# Patient Record
Sex: MALE | Race: OTHER | NOT HISPANIC OR LATINO | URBAN - METROPOLITAN AREA
[De-identification: names, ages, dates, MRNs, and addresses within clinical notes are randomized per-mention and may not be internally consistent; named-entity substitution may affect disease eponyms.]

---

## 2017-10-04 ENCOUNTER — EMERGENCY (EMERGENCY)
Facility: HOSPITAL | Age: 67
LOS: 1 days | Discharge: HOME | End: 2017-10-04
Attending: EMERGENCY MEDICINE | Admitting: EMERGENCY MEDICINE
Payer: COMMERCIAL

## 2017-10-04 VITALS
OXYGEN SATURATION: 96 % | HEART RATE: 81 BPM | RESPIRATION RATE: 20 BRPM | DIASTOLIC BLOOD PRESSURE: 71 MMHG | TEMPERATURE: 100 F | SYSTOLIC BLOOD PRESSURE: 143 MMHG

## 2017-10-04 DIAGNOSIS — Z95.5 PRESENCE OF CORONARY ANGIOPLASTY IMPLANT AND GRAFT: Chronic | ICD-10-CM

## 2017-10-04 PROCEDURE — 99284 EMERGENCY DEPT VISIT MOD MDM: CPT | Mod: 25

## 2017-10-04 RX ORDER — MORPHINE SULFATE 50 MG/1
4 CAPSULE, EXTENDED RELEASE ORAL ONCE
Qty: 0 | Refills: 0 | Status: DISCONTINUED | OUTPATIENT
Start: 2017-10-04 | End: 2017-10-05
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
## 2017-10-04 NOTE — ED PROVIDER NOTE - PROGRESS NOTE DETAILS
X-ray did not show any signs of fracture. Films have not been officially read. Pt was informed that he will be called for any update. Pt is stable and ready to be discharged.
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
## 2017-10-04 NOTE — ED PROVIDER NOTE - PHYSICAL EXAMINATION
LE exam: Large ecchymosis to the medial aspect of the left knee, normal ROM at the Knee/Hip and ankle, significant swelling to the left ankle, no erythema, no warmth to touch, tenderness to the left hip
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
## 2017-10-05 VITALS
SYSTOLIC BLOOD PRESSURE: 153 MMHG | TEMPERATURE: 98 F | OXYGEN SATURATION: 97 % | RESPIRATION RATE: 18 BRPM | HEART RATE: 73 BPM | DIASTOLIC BLOOD PRESSURE: 90 MMHG

## 2017-10-05 PROCEDURE — 73502 X-RAY EXAM HIP UNI 2-3 VIEWS: CPT

## 2017-10-05 PROCEDURE — 73590 X-RAY EXAM OF LOWER LEG: CPT | Mod: 26,LT

## 2017-10-05 PROCEDURE — 73564 X-RAY EXAM KNEE 4 OR MORE: CPT | Mod: 26,LT

## 2017-10-05 PROCEDURE — 73610 X-RAY EXAM OF ANKLE: CPT

## 2017-10-05 PROCEDURE — 73552 X-RAY EXAM OF FEMUR 2/>: CPT

## 2017-10-05 PROCEDURE — 73564 X-RAY EXAM KNEE 4 OR MORE: CPT

## 2017-10-05 PROCEDURE — 99284 EMERGENCY DEPT VISIT MOD MDM: CPT | Mod: 25

## 2017-10-05 PROCEDURE — 73552 X-RAY EXAM OF FEMUR 2/>: CPT | Mod: 26,LT

## 2017-10-05 PROCEDURE — 73610 X-RAY EXAM OF ANKLE: CPT | Mod: 26,LT

## 2017-10-05 PROCEDURE — 73502 X-RAY EXAM HIP UNI 2-3 VIEWS: CPT | Mod: 26,LT

## 2017-10-05 PROCEDURE — 73590 X-RAY EXAM OF LOWER LEG: CPT

## 2017-10-05 RX ORDER — ACETAMINOPHEN 500 MG
975 TABLET ORAL ONCE
Qty: 0 | Refills: 0 | Status: COMPLETED | OUTPATIENT
Start: 2017-10-05 | End: 2017-10-05

## 2017-10-05 RX ADMIN — Medication 975 MILLIGRAM(S): at 00:54

## 2017-10-05 NOTE — ED POST DISCHARGE NOTE - OTHER COMMUNICATION
patient felt the site where he had an abrasion while on the phone with me and was able to remove a curved metallic fragment from the skin. He reports the fragment to be almost 1 cm in length. He has no complaints and plans to cover the site with a bandage. I instructed the patient to return to the ED if he has any wound changes at the site. I informed him that he will require a tetanus shot from his primary care doctor.. - Fernie Mckeon PA-C

## 2018-02-01 ENCOUNTER — OUTPATIENT (OUTPATIENT)
Dept: OUTPATIENT SERVICES | Facility: HOSPITAL | Age: 68
LOS: 1 days | End: 2018-02-01
Payer: MEDICAID

## 2018-02-01 DIAGNOSIS — Z95.5 PRESENCE OF CORONARY ANGIOPLASTY IMPLANT AND GRAFT: Chronic | ICD-10-CM

## 2018-02-24 ENCOUNTER — EMERGENCY (EMERGENCY)
Facility: HOSPITAL | Age: 68
LOS: 1 days | Discharge: HOME | End: 2018-02-24
Attending: EMERGENCY MEDICINE | Admitting: EMERGENCY MEDICINE
Payer: MEDICARE

## 2018-02-24 VITALS
HEIGHT: 74 IN | TEMPERATURE: 98 F | OXYGEN SATURATION: 94 % | RESPIRATION RATE: 18 BRPM | WEIGHT: 192.02 LBS | SYSTOLIC BLOOD PRESSURE: 134 MMHG | DIASTOLIC BLOOD PRESSURE: 92 MMHG | HEART RATE: 88 BPM

## 2018-02-24 VITALS
HEART RATE: 82 BPM | TEMPERATURE: 98 F | OXYGEN SATURATION: 99 % | RESPIRATION RATE: 17 BRPM | SYSTOLIC BLOOD PRESSURE: 115 MMHG | DIASTOLIC BLOOD PRESSURE: 68 MMHG

## 2018-02-24 DIAGNOSIS — Z95.5 PRESENCE OF CORONARY ANGIOPLASTY IMPLANT AND GRAFT: Chronic | ICD-10-CM

## 2018-02-24 LAB
ALBUMIN SERPL ELPH-MCNC: 3.9 G/DL — SIGNIFICANT CHANGE UP (ref 3.3–5)
ALP SERPL-CCNC: 57 U/L — SIGNIFICANT CHANGE UP (ref 40–120)
ALT FLD-CCNC: 19 U/L RC — SIGNIFICANT CHANGE UP (ref 10–45)
ANION GAP SERPL CALC-SCNC: 11 MMOL/L — SIGNIFICANT CHANGE UP (ref 5–17)
AST SERPL-CCNC: 15 U/L — SIGNIFICANT CHANGE UP (ref 10–40)
BASOPHILS # BLD AUTO: 0 K/UL — SIGNIFICANT CHANGE UP (ref 0–0.2)
BASOPHILS NFR BLD AUTO: 0.7 % — SIGNIFICANT CHANGE UP (ref 0–2)
BILIRUB SERPL-MCNC: 0.6 MG/DL — SIGNIFICANT CHANGE UP (ref 0.2–1.2)
BUN SERPL-MCNC: 27 MG/DL — HIGH (ref 7–23)
CALCIUM SERPL-MCNC: 9.3 MG/DL — SIGNIFICANT CHANGE UP (ref 8.4–10.5)
CHLORIDE SERPL-SCNC: 101 MMOL/L — SIGNIFICANT CHANGE UP (ref 96–108)
CO2 SERPL-SCNC: 25 MMOL/L — SIGNIFICANT CHANGE UP (ref 22–31)
CREAT SERPL-MCNC: 1.46 MG/DL — HIGH (ref 0.5–1.3)
EOSINOPHIL # BLD AUTO: 0.1 K/UL — SIGNIFICANT CHANGE UP (ref 0–0.5)
EOSINOPHIL NFR BLD AUTO: 1.3 % — SIGNIFICANT CHANGE UP (ref 0–6)
FLUAV H1 2009 PAND RNA SPEC QL NAA+PROBE: DETECTED
GLUCOSE SERPL-MCNC: 137 MG/DL — HIGH (ref 70–99)
HCT VFR BLD CALC: 42.7 % — SIGNIFICANT CHANGE UP (ref 39–50)
HGB BLD-MCNC: 14.1 G/DL — SIGNIFICANT CHANGE UP (ref 13–17)
LYMPHOCYTES # BLD AUTO: 1 K/UL — SIGNIFICANT CHANGE UP (ref 1–3.3)
LYMPHOCYTES # BLD AUTO: 17.3 % — SIGNIFICANT CHANGE UP (ref 13–44)
MCHC RBC-ENTMCNC: 30.6 PG — SIGNIFICANT CHANGE UP (ref 27–34)
MCHC RBC-ENTMCNC: 33 GM/DL — SIGNIFICANT CHANGE UP (ref 32–36)
MCV RBC AUTO: 92.7 FL — SIGNIFICANT CHANGE UP (ref 80–100)
MONOCYTES # BLD AUTO: 0.6 K/UL — SIGNIFICANT CHANGE UP (ref 0–0.9)
MONOCYTES NFR BLD AUTO: 10.4 % — SIGNIFICANT CHANGE UP (ref 2–14)
NEUTROPHILS # BLD AUTO: 3.9 K/UL — SIGNIFICANT CHANGE UP (ref 1.8–7.4)
NEUTROPHILS NFR BLD AUTO: 70.3 % — SIGNIFICANT CHANGE UP (ref 43–77)
PLATELET # BLD AUTO: 187 K/UL — SIGNIFICANT CHANGE UP (ref 150–400)
POTASSIUM SERPL-MCNC: 4.8 MMOL/L — SIGNIFICANT CHANGE UP (ref 3.5–5.3)
POTASSIUM SERPL-SCNC: 4.8 MMOL/L — SIGNIFICANT CHANGE UP (ref 3.5–5.3)
PROT SERPL-MCNC: 7.1 G/DL — SIGNIFICANT CHANGE UP (ref 6–8.3)
RAPID RVP RESULT: DETECTED
RBC # BLD: 4.61 M/UL — SIGNIFICANT CHANGE UP (ref 4.2–5.8)
RBC # FLD: 11.4 % — SIGNIFICANT CHANGE UP (ref 10.3–14.5)
SODIUM SERPL-SCNC: 137 MMOL/L — SIGNIFICANT CHANGE UP (ref 135–145)
WBC # BLD: 5.5 K/UL — SIGNIFICANT CHANGE UP (ref 3.8–10.5)
WBC # FLD AUTO: 5.5 K/UL — SIGNIFICANT CHANGE UP (ref 3.8–10.5)

## 2018-02-24 PROCEDURE — 85027 COMPLETE CBC AUTOMATED: CPT

## 2018-02-24 PROCEDURE — 87633 RESP VIRUS 12-25 TARGETS: CPT

## 2018-02-24 PROCEDURE — 71046 X-RAY EXAM CHEST 2 VIEWS: CPT

## 2018-02-24 PROCEDURE — 99284 EMERGENCY DEPT VISIT MOD MDM: CPT | Mod: 25

## 2018-02-24 PROCEDURE — 99283 EMERGENCY DEPT VISIT LOW MDM: CPT | Mod: 25

## 2018-02-24 PROCEDURE — 87486 CHLMYD PNEUM DNA AMP PROBE: CPT

## 2018-02-24 PROCEDURE — 80053 COMPREHEN METABOLIC PANEL: CPT

## 2018-02-24 PROCEDURE — 71046 X-RAY EXAM CHEST 2 VIEWS: CPT | Mod: 26

## 2018-02-24 PROCEDURE — 87798 DETECT AGENT NOS DNA AMP: CPT

## 2018-02-24 PROCEDURE — 87581 M.PNEUMON DNA AMP PROBE: CPT

## 2018-02-24 RX ORDER — SODIUM CHLORIDE 9 MG/ML
1000 INJECTION INTRAMUSCULAR; INTRAVENOUS; SUBCUTANEOUS ONCE
Qty: 0 | Refills: 0 | Status: COMPLETED | OUTPATIENT
Start: 2018-02-24 | End: 2018-02-24

## 2018-02-24 RX ADMIN — SODIUM CHLORIDE 1000 MILLILITER(S): 9 INJECTION INTRAMUSCULAR; INTRAVENOUS; SUBCUTANEOUS at 04:04

## 2018-02-24 RX ADMIN — Medication 75 MILLIGRAM(S): at 06:35

## 2018-02-24 NOTE — ED PROVIDER NOTE - PLAN OF CARE
1) You were here for flu.    2) Take your medicine as prescribed.   3) Follow up with your primary doctor for further evaluation and to answer any questions you have.    4) Return to the emergency department if you experience worsening symptoms, pain, fever, chills, nausea, vomiting or other concerning symptoms.

## 2018-02-24 NOTE — ED ADULT NURSE NOTE - OBJECTIVE STATEMENT
66 yo male pt with history of dm, cad, s/p one stent one year ago presents to ed complaining of cough and fevers for three days. pt states he took tylenol at 1am prior to arrival in ed. pt afebrile. pt also endorses left sided intermittent pleuritic nonradiating sharp chest pain that only occurs while coughing. dry cough presents. pt denies acosta/chills/diaphoresis/n/v/decreased po intake/weakness/sob/recent travel/sick contacts. breath sounds clear and equal bilaterally. skin warm dry and intact. abd nontender and nondistended. pt ambulates with steady gait.

## 2018-02-24 NOTE — ED PROVIDER NOTE - MEDICAL DECISION MAKING DETAILS
Cough, myalgias, fever x 2 days.  Decreased PO but not ill appearing.  No signficiant co-morbidities.  Would benefit from fluids, labs/RVP, CXR, reassess.  Likely viral syndrome, likely d/c.  --BMM

## 2018-02-24 NOTE — ED PROVIDER NOTE - CARE PLAN
Principal Discharge DX:	Influenza A  Assessment and plan of treatment:	1) You were here for flu.    2) Take your medicine as prescribed.   3) Follow up with your primary doctor for further evaluation and to answer any questions you have.    4) Return to the emergency department if you experience worsening symptoms, pain, fever, chills, nausea, vomiting or other concerning symptoms.

## 2018-02-24 NOTE — ED PROVIDER NOTE - OBJECTIVE STATEMENT
67M with past medical history Diabetes Mellitus, Coronary Artery Disease status post stent 1 yr ago presents with 3d h/o cough, intermittent pleuritic L sided non radiating, sharp chest pain and suprapubic pain.  Pain occurs with cough, not otherwise.  Denies exertional symptoms.  Denies fever, chills, n/v, diaphoresis, shortness of breath, recent travel.  Got flu vaccine this year.

## 2018-02-26 DIAGNOSIS — R69 ILLNESS, UNSPECIFIED: ICD-10-CM

## 2018-02-27 ENCOUNTER — APPOINTMENT (OUTPATIENT)
Dept: FAMILY MEDICINE | Facility: CLINIC | Age: 68
End: 2018-02-27
Payer: MEDICARE

## 2018-02-27 VITALS
BODY MASS INDEX: 30.51 KG/M2 | HEIGHT: 68.9 IN | OXYGEN SATURATION: 96 % | DIASTOLIC BLOOD PRESSURE: 70 MMHG | HEART RATE: 73 BPM | SYSTOLIC BLOOD PRESSURE: 142 MMHG | RESPIRATION RATE: 15 BRPM | TEMPERATURE: 98.4 F | WEIGHT: 206 LBS

## 2018-02-27 DIAGNOSIS — Z78.9 OTHER SPECIFIED HEALTH STATUS: ICD-10-CM

## 2018-02-27 PROCEDURE — 99202 OFFICE O/P NEW SF 15 MIN: CPT

## 2018-02-27 RX ORDER — ASPIRIN ENTERIC COATED TABLETS 81 MG 81 MG/1
81 TABLET, DELAYED RELEASE ORAL
Qty: 30 | Refills: 0 | Status: ACTIVE | COMMUNITY
Start: 2018-02-27

## 2018-03-13 ENCOUNTER — APPOINTMENT (OUTPATIENT)
Dept: CARDIOLOGY | Facility: CLINIC | Age: 68
End: 2018-03-13
Payer: MEDICARE

## 2018-03-13 ENCOUNTER — NON-APPOINTMENT (OUTPATIENT)
Age: 68
End: 2018-03-13

## 2018-03-13 VITALS — SYSTOLIC BLOOD PRESSURE: 110 MMHG | DIASTOLIC BLOOD PRESSURE: 60 MMHG

## 2018-03-13 VITALS
HEIGHT: 72 IN | BODY MASS INDEX: 27.9 KG/M2 | WEIGHT: 206 LBS | TEMPERATURE: 97.9 F | HEART RATE: 70 BPM | SYSTOLIC BLOOD PRESSURE: 112 MMHG | DIASTOLIC BLOOD PRESSURE: 62 MMHG | OXYGEN SATURATION: 97 %

## 2018-03-13 PROCEDURE — 99205 OFFICE O/P NEW HI 60 MIN: CPT

## 2018-03-13 PROCEDURE — 93000 ELECTROCARDIOGRAM COMPLETE: CPT

## 2018-03-13 RX ORDER — LISINOPRIL 5 MG/1
5 TABLET ORAL
Refills: 0 | Status: ACTIVE | COMMUNITY

## 2018-03-14 LAB
25(OH)D3 SERPL-MCNC: 28.5 NG/ML
ALBUMIN SERPL ELPH-MCNC: 3.8 G/DL
ALP BLD-CCNC: 53 U/L
ALT SERPL-CCNC: 20 U/L
ANION GAP SERPL CALC-SCNC: 11 MMOL/L
APPEARANCE: CLEAR
AST SERPL-CCNC: 20 U/L
BACTERIA: NEGATIVE
BASOPHILS # BLD AUTO: 0.05 K/UL
BASOPHILS NFR BLD AUTO: 1 %
BILIRUB SERPL-MCNC: 0.5 MG/DL
BILIRUBIN URINE: NEGATIVE
BLOOD URINE: NEGATIVE
BUN SERPL-MCNC: 21 MG/DL
CALCIUM SERPL-MCNC: 9.3 MG/DL
CHLORIDE SERPL-SCNC: 105 MMOL/L
CHOLEST SERPL-MCNC: 168 MG/DL
CHOLEST/HDLC SERPL: 3 RATIO
CO2 SERPL-SCNC: 24 MMOL/L
COLOR: YELLOW
CREAT SERPL-MCNC: 1.22 MG/DL
EOSINOPHIL # BLD AUTO: 0.11 K/UL
EOSINOPHIL NFR BLD AUTO: 2.2 %
FOLATE SERPL-MCNC: 19.1 NG/ML
GGT SERPL-CCNC: 23 U/L
GLUCOSE QUALITATIVE U: NEGATIVE MG/DL
GLUCOSE SERPL-MCNC: 100 MG/DL
HBA1C MFR BLD HPLC: 9.2 %
HCT VFR BLD CALC: 43.2 %
HDLC SERPL-MCNC: 56 MG/DL
HGB BLD-MCNC: 14 G/DL
IMM GRANULOCYTES NFR BLD AUTO: 0.2 %
KETONES URINE: NEGATIVE
LDLC SERPL CALC-MCNC: 95 MG/DL
LEUKOCYTE ESTERASE URINE: NEGATIVE
LYMPHOCYTES # BLD AUTO: 1.22 K/UL
LYMPHOCYTES NFR BLD AUTO: 24.1 %
MAN DIFF?: NORMAL
MCHC RBC-ENTMCNC: 30.6 PG
MCHC RBC-ENTMCNC: 32.4 GM/DL
MCV RBC AUTO: 94.5 FL
MICROSCOPIC-UA: NORMAL
MONOCYTES # BLD AUTO: 0.41 K/UL
MONOCYTES NFR BLD AUTO: 8.1 %
NEUTROPHILS # BLD AUTO: 3.27 K/UL
NEUTROPHILS NFR BLD AUTO: 64.4 %
NITRITE URINE: NEGATIVE
PH URINE: 7.5
PLATELET # BLD AUTO: 254 K/UL
POTASSIUM SERPL-SCNC: 4.9 MMOL/L
PROT SERPL-MCNC: 6.9 G/DL
PROTEIN URINE: NEGATIVE MG/DL
PSA FREE FLD-MCNC: 28.5
PSA FREE SERPL-MCNC: 1.24 NG/ML
PSA SERPL-MCNC: 4.35 NG/ML
RBC # BLD: 4.57 M/UL
RBC # FLD: 13.2 %
RED BLOOD CELLS URINE: 0 /HPF
SODIUM SERPL-SCNC: 140 MMOL/L
SPECIFIC GRAVITY URINE: 1.01
SQUAMOUS EPITHELIAL CELLS: 0 /HPF
T4 FREE SERPL-MCNC: 1 NG/DL
T4 SERPL-MCNC: 5 UG/DL
TRIGL SERPL-MCNC: 87 MG/DL
TSH SERPL-ACNC: 2.54 UIU/ML
UROBILINOGEN URINE: NEGATIVE MG/DL
VIT B12 SERPL-MCNC: 1702 PG/ML
WBC # FLD AUTO: 5.07 K/UL
WHITE BLOOD CELLS URINE: 0 /HPF

## 2018-03-15 ENCOUNTER — RX RENEWAL (OUTPATIENT)
Age: 68
End: 2018-03-15

## 2018-03-15 LAB — BACTERIA UR CULT: NORMAL

## 2018-03-20 ENCOUNTER — APPOINTMENT (OUTPATIENT)
Dept: FAMILY MEDICINE | Facility: CLINIC | Age: 68
End: 2018-03-20
Payer: MEDICARE

## 2018-03-20 VITALS
BODY MASS INDEX: 28.17 KG/M2 | OXYGEN SATURATION: 97 % | TEMPERATURE: 98.4 F | SYSTOLIC BLOOD PRESSURE: 120 MMHG | DIASTOLIC BLOOD PRESSURE: 60 MMHG | HEIGHT: 72 IN | HEART RATE: 72 BPM | WEIGHT: 208 LBS | RESPIRATION RATE: 15 BRPM

## 2018-03-20 PROCEDURE — G0439: CPT

## 2018-03-20 RX ORDER — INSULIN ASPART 100 [IU]/ML
100 INJECTION, SOLUTION INTRAVENOUS; SUBCUTANEOUS
Qty: 1 | Refills: 5 | Status: DISCONTINUED | COMMUNITY
Start: 2018-03-15 | End: 2018-03-20

## 2018-03-23 RX ORDER — INSULIN LISPRO 100 [IU]/ML
100 INJECTION, SOLUTION INTRAVENOUS; SUBCUTANEOUS
Qty: 1 | Refills: 0 | Status: DISCONTINUED | COMMUNITY
Start: 2018-03-20 | End: 2018-03-23

## 2018-03-30 ENCOUNTER — APPOINTMENT (OUTPATIENT)
Dept: ENDOCRINOLOGY | Facility: CLINIC | Age: 68
End: 2018-03-30
Payer: MEDICARE

## 2018-03-30 VITALS
DIASTOLIC BLOOD PRESSURE: 68 MMHG | WEIGHT: 208 LBS | HEART RATE: 74 BPM | SYSTOLIC BLOOD PRESSURE: 128 MMHG | HEIGHT: 72 IN | BODY MASS INDEX: 28.17 KG/M2 | OXYGEN SATURATION: 98 %

## 2018-03-30 PROCEDURE — 95250 CONT GLUC MNTR PHYS/QHP EQP: CPT

## 2018-03-30 PROCEDURE — 99205 OFFICE O/P NEW HI 60 MIN: CPT

## 2018-03-30 PROCEDURE — 95251 CONT GLUC MNTR ANALYSIS I&R: CPT

## 2018-04-06 ENCOUNTER — APPOINTMENT (OUTPATIENT)
Dept: DERMATOLOGY | Facility: CLINIC | Age: 68
End: 2018-04-06
Payer: MEDICARE

## 2018-04-06 VITALS
WEIGHT: 197 LBS | SYSTOLIC BLOOD PRESSURE: 126 MMHG | DIASTOLIC BLOOD PRESSURE: 82 MMHG | HEIGHT: 72 IN | BODY MASS INDEX: 26.68 KG/M2

## 2018-04-06 PROCEDURE — 99203 OFFICE O/P NEW LOW 30 MIN: CPT

## 2018-04-09 ENCOUNTER — RX RENEWAL (OUTPATIENT)
Age: 68
End: 2018-04-09

## 2018-04-10 ENCOUNTER — RX RENEWAL (OUTPATIENT)
Age: 68
End: 2018-04-10

## 2018-04-10 LAB
CREAT SPEC-SCNC: 112 MG/DL
MICROALBUMIN 24H UR DL<=1MG/L-MCNC: <0.3 MG/DL
MICROALBUMIN/CREAT 24H UR-RTO: NORMAL

## 2018-04-26 ENCOUNTER — APPOINTMENT (OUTPATIENT)
Dept: GASTROENTEROLOGY | Facility: CLINIC | Age: 68
End: 2018-04-26

## 2018-04-30 ENCOUNTER — CLINICAL ADVICE (OUTPATIENT)
Age: 68
End: 2018-04-30

## 2018-05-01 PROCEDURE — G9001: CPT

## 2018-05-01 PROCEDURE — G9005: CPT

## 2018-05-04 ENCOUNTER — APPOINTMENT (OUTPATIENT)
Dept: ENDOCRINOLOGY | Facility: CLINIC | Age: 68
End: 2018-05-04

## 2018-05-17 ENCOUNTER — EMERGENCY (EMERGENCY)
Facility: HOSPITAL | Age: 68
LOS: 1 days | Discharge: HOME | End: 2018-05-17
Attending: EMERGENCY MEDICINE
Payer: MEDICARE

## 2018-05-17 VITALS
TEMPERATURE: 98 F | RESPIRATION RATE: 17 BRPM | SYSTOLIC BLOOD PRESSURE: 152 MMHG | HEART RATE: 65 BPM | OXYGEN SATURATION: 98 % | DIASTOLIC BLOOD PRESSURE: 62 MMHG | WEIGHT: 188.05 LBS

## 2018-05-17 VITALS
OXYGEN SATURATION: 98 % | TEMPERATURE: 98 F | DIASTOLIC BLOOD PRESSURE: 87 MMHG | SYSTOLIC BLOOD PRESSURE: 156 MMHG | RESPIRATION RATE: 18 BRPM | HEART RATE: 68 BPM

## 2018-05-17 DIAGNOSIS — Z95.5 PRESENCE OF CORONARY ANGIOPLASTY IMPLANT AND GRAFT: Chronic | ICD-10-CM

## 2018-05-17 PROCEDURE — 99284 EMERGENCY DEPT VISIT MOD MDM: CPT | Mod: 25

## 2018-05-17 PROCEDURE — 93971 EXTREMITY STUDY: CPT | Mod: 26

## 2018-05-17 PROCEDURE — 99284 EMERGENCY DEPT VISIT MOD MDM: CPT

## 2018-05-17 PROCEDURE — 93971 EXTREMITY STUDY: CPT

## 2018-05-17 RX ORDER — METFORMIN HYDROCHLORIDE 850 MG/1
0 TABLET ORAL
Qty: 0 | Refills: 0 | COMMUNITY

## 2018-05-17 RX ORDER — CLOPIDOGREL BISULFATE 75 MG/1
1 TABLET, FILM COATED ORAL
Qty: 0 | Refills: 0 | COMMUNITY

## 2018-05-17 RX ORDER — ISOSORBIDE DINITRATE 5 MG/1
0 TABLET ORAL
Qty: 0 | Refills: 0 | COMMUNITY

## 2018-05-17 RX ORDER — ATORVASTATIN CALCIUM 80 MG/1
0 TABLET, FILM COATED ORAL
Qty: 0 | Refills: 0 | COMMUNITY

## 2018-05-17 RX ORDER — INSULIN GLARGINE 100 [IU]/ML
0 INJECTION, SOLUTION SUBCUTANEOUS
Qty: 0 | Refills: 0 | COMMUNITY

## 2018-05-17 RX ORDER — LISINOPRIL 2.5 MG/1
0 TABLET ORAL
Qty: 0 | Refills: 0 | COMMUNITY

## 2018-05-17 RX ORDER — REPAGLINIDE 1 MG/1
0 TABLET ORAL
Qty: 0 | Refills: 0 | COMMUNITY

## 2018-05-17 RX ORDER — INSULIN ASPART 100 [IU]/ML
0 INJECTION, SOLUTION SUBCUTANEOUS
Qty: 0 | Refills: 0 | COMMUNITY

## 2018-05-17 NOTE — ED PROCEDURE NOTE - ATTENDING CONTRIBUTION TO CARE
I have participated in and supervised all key portions of the above procedures and agree with the above documentation. - Morgan BAEZA

## 2018-05-17 NOTE — ED PROVIDER NOTE - PLAN OF CARE
Follow up with your primary care doctor and your endocrinologist this week. Your initial ultrasound looking for a deep vein thrombosis was negative for a clot. It is necessary for your to obtain a repeat ultrasound in 1 week to ensure that there is no clot.   Rest, apply ice over covered skin for no more than 15 minutes at a time, keep affected extremity elevated,.  Take Tylenol 650mg 1 tab every 4-6 hours as needed for pain.

## 2018-05-17 NOTE — ED PROVIDER NOTE - CARE PLAN
Principal Discharge DX:	Leg pain, inferior, left  Assessment and plan of treatment:	Follow up with your primary care doctor and your endocrinologist this week. Your initial ultrasound looking for a deep vein thrombosis was negative for a clot. It is necessary for your to obtain a repeat ultrasound in 1 week to ensure that there is no clot.   Rest, apply ice over covered skin for no more than 15 minutes at a time, keep affected extremity elevated,.  Take Tylenol 650mg 1 tab every 4-6 hours as needed for pain.

## 2018-05-17 NOTE — ED PROVIDER NOTE - OBJECTIVE STATEMENT
66 yo M pmhx of CAD sp stent on DAP, DMII on insulin with left blood glucose monitor (last a1c 8.9) presenting with 2 weeks of left sided burning leg pain that radiates from dorsum of foot to below knee. Pain worse if stationary. Better with ambulation. Minimal relief from tylenol. persistant, progerssive onset. radiating, Noticed LLE swelling and cramping last night. Notes similar pain in  RLE but left is worse and causes limitations. Pt endorses chronic bl ul decreased sensation in hands. No sob, no chest pain, no back pain, no fever, no dysuria, no recent surgery, no trauma, no urinary or bowel complaints.

## 2018-05-17 NOTE — ED ADULT NURSE NOTE - OBJECTIVE STATEMENT
67y male c/o left leg pain. A&Ox3 and VSS. Hx of DM ( right arm blood glucose monitor) and 2 stents on plavix. Pt reports left leg pain x 2 weeks from left knee to left foot. Pt reports sudden onset sharp stabbing pain at 3am. Pt took tylenol for pain with minimal relief. Reports residual left leg cramping, left foot burning and numbness in left toes. + left pedal pulses. Pt reports pain improves upon walking. Denies trauma. Pt reports noticing left leg swelling, no swelling noted upon exam.

## 2018-05-17 NOTE — ED PROVIDER NOTE - ATTENDING CONTRIBUTION TO CARE
Patient is a 66 yo M with hx of DM, CAD w/ 2 stents on plavix here for evaluation of left leg discomfort x 2 weeks. Pain is in lower left leg described as stabbing, cramping, burning and numbness. No trauma. ? swelling per patient. No history of DVT/PE. Denies chest pain, sob, abdominal pain. + numbness/ burning in right leg but L > R.   VS noted  Gen. no acute distress, Non toxic   HEENT: EOMI, mmm,   Lungs: CTAB/L no C/ W /R   CVS: RRR   Abd; Soft non tender, non distended   Ext: no edema, no calf tenderness  Skin: no rash  Neuro AAOx3 non focal clear speech, strength is 5/5 in UE/LE  a/p: left leg pain - likely diabetic neuropathy - will do focused ED US to r/o DVT. No concern for infection. If US neg, pt instructed to have repeat US in 1 week and follow up with pmd.   - Morgan BAEZA

## 2018-05-17 NOTE — ED PROVIDER NOTE - MEDICAL DECISION MAKING DETAILS
[facility]: 66[facility] M with long standing diabetes with suggestion that it is poorly controlled. presetnign with BL L>>R atraumatic leg pain progressive for two weeks. Concerning for diabetic neuropathy versus dorsalgia, less likely dvt. Low suspicion for claudication given improvement with exertion. No signs or symptoms of PE. Will obtain finger stick and U/S LLE, supportive care. Endocrine/PMD follow up.

## 2018-05-17 NOTE — ED PROVIDER NOTE - NS ED ROS FT
No fever/chills, no change in vision, no rhinorrhea, no throat pain, no sob, no chest pain, no abdominal pain, no nausea/vomiting,  no dysuria, no joint pain, no rashes, +focal numbness (see hpi), GERMANE yeny, no known mental health issues, +dm, no latex allergy

## 2018-05-18 ENCOUNTER — APPOINTMENT (OUTPATIENT)
Dept: FAMILY MEDICINE | Facility: CLINIC | Age: 68
End: 2018-05-18
Payer: MEDICARE

## 2018-05-18 VITALS — DIASTOLIC BLOOD PRESSURE: 74 MMHG | TEMPERATURE: 98.2 F | SYSTOLIC BLOOD PRESSURE: 120 MMHG

## 2018-05-18 LAB — HBA1C MFR BLD HPLC: 7.8

## 2018-05-18 PROCEDURE — 83036 HEMOGLOBIN GLYCOSYLATED A1C: CPT | Mod: QW

## 2018-05-18 PROCEDURE — 99495 TRANSJ CARE MGMT MOD F2F 14D: CPT

## 2018-05-18 NOTE — HEALTH RISK ASSESSMENT
[Intercurrent ED visits] : went to ED [No falls in past year] : Patient reported no falls in the past year

## 2018-05-18 NOTE — PHYSICAL EXAM
[No Acute Distress] : no acute distress [Well Nourished] : well nourished [Well Developed] : well developed [Normal Voice/Communication] : normal voice/communication [No Respiratory Distress] : no respiratory distress  [Clear to Auscultation] : lungs were clear to auscultation bilaterally [No Accessory Muscle Use] : no accessory muscle use [Normal Rate] : normal rate  [Regular Rhythm] : with a regular rhythm [Normal S1, S2] : normal S1 and S2 [No Rash] : no rash [de-identified] : no calf swelling

## 2018-05-18 NOTE — HISTORY OF PRESENT ILLNESS
[FreeTextEntry1] : followup emergency room visit [de-identified] : 68-year-old male presents to followup on emergency room visit yesterday for left leg pain. Patient states that the pain feels like a shooting sensation starts in his left knee and then radiates to the top of the foot. Feels like a pulling burning 2/10 severity worse with walking only slightly better with rest but still present. No alleviating factors. Emergency room completed a lower extremity Doppler which was negative. patient does have history of diabetes and is on Lantus and metformin. He also follows up with his endocrinologist. He has a new device that monitors his blood sugar and usually the range is around 100 to 130. He has significantly changed his diet. patient is also on Plavix for coronary artery disease status post coronary artery stenting in January and February of 2017.

## 2018-05-24 ENCOUNTER — EMERGENCY (EMERGENCY)
Facility: HOSPITAL | Age: 68
LOS: 1 days | Discharge: HOME | End: 2018-05-24
Attending: EMERGENCY MEDICINE
Payer: MEDICARE

## 2018-05-24 ENCOUNTER — RX RENEWAL (OUTPATIENT)
Age: 68
End: 2018-05-24

## 2018-05-24 VITALS
HEART RATE: 73 BPM | SYSTOLIC BLOOD PRESSURE: 138 MMHG | OXYGEN SATURATION: 97 % | RESPIRATION RATE: 17 BRPM | TEMPERATURE: 98 F | DIASTOLIC BLOOD PRESSURE: 81 MMHG

## 2018-05-24 VITALS
RESPIRATION RATE: 16 BRPM | HEART RATE: 75 BPM | SYSTOLIC BLOOD PRESSURE: 134 MMHG | TEMPERATURE: 98 F | DIASTOLIC BLOOD PRESSURE: 72 MMHG | OXYGEN SATURATION: 96 %

## 2018-05-24 DIAGNOSIS — Z95.5 PRESENCE OF CORONARY ANGIOPLASTY IMPLANT AND GRAFT: Chronic | ICD-10-CM

## 2018-05-24 PROCEDURE — 93971 EXTREMITY STUDY: CPT | Mod: 26

## 2018-05-24 PROCEDURE — 93971 EXTREMITY STUDY: CPT

## 2018-05-24 PROCEDURE — 99284 EMERGENCY DEPT VISIT MOD MDM: CPT

## 2018-05-24 RX ORDER — ATORVASTATIN CALCIUM 80 MG/1
80 TABLET, FILM COATED ORAL
Qty: 30 | Refills: 0 | Status: ACTIVE | COMMUNITY
Start: 1900-01-01 | End: 1900-01-01

## 2018-05-24 NOTE — ED ADULT NURSE NOTE - OBJECTIVE STATEMENT
68 year old male presents to the ED complaining of left lower leg pain x 2 weeks, pt states that he came in today for an ultrasound of the LLE. As per Pt he had a negative US last week at [facility]. PPP equal bilaterally, Pt able to ambulate, neurosensory intact below injury, pt has strong use of all extremities. Pt is A&O x 4, VSS, afebrile, ambulating independently. Pt denies fever, chills, NVD, SOB, or chest pain.

## 2018-05-24 NOTE — ED ADULT NURSE NOTE - CHPI ED SYMPTOMS NEG
no weakness/no vomiting/no dizziness/no chills/no fever/no numbness/no nausea/no decreased eating/drinking/no tingling

## 2018-05-24 NOTE — ED PROVIDER NOTE - OBJECTIVE STATEMENT
68 year old male with pmhx of type 2 diabetes, CAD s/p 2 stents returns for a repeat US of L leg. Had negative ED US done last week. Patient still c/o pain and discomfort of L leg. Denies any other complaint at this time.

## 2018-05-24 NOTE — ED PROCEDURE NOTE - PROCEDURE ADDITIONAL DETAILS
POCUS: Emergency Department Focused Ultrasound performed at patient's bedside.  The complete report will be available in PACS.    no proximal dvt of the left lower extremity.

## 2018-05-24 NOTE — ED PROVIDER NOTE - NS_ ATTENDINGSCRIBEDETAILS _ED_A_ED_FT
[I agree with scribe documentation except where as noted below]  68 yom pmhx cad w stents dm2 presents w ongoing mild left calf pain. was seen in ED for same one week ago at the request of his pmd for r/o dvt - had ED US at that time which was negative. pt was counseled to return to the ER in  5-7 days for repeat study which is why pt is here today. no truama. states left leg/ calf pain is mild and continues from initial eval. no f/c, no redness, no swelling.     ros as above    Physical Exam:   constitutional - well appearing, awake and alert, oriented x3  head - no external evidence of trauma  cvs - rrr, no murmurs, no peripheral edema  resp - breath sounds clear and equal bilat  gi - abdomen soft and nontender, no rigidity, guarding or rebound, bowel sounds present  msk - moving all extremities spontaneously. very mild left calf tenderness. no overlying skin changes, gait stable  neuro - alert and oriented x3, no focal deficits, CNs 2-12 grossly intact  skin- no jaundice, warm and dry  psych - mood and affect wnl, no apparent risk to self or others     will repeat US - ? whether mild pad as cause in setting of known cad. additional verbal instructions regarding diagnosis, return precautions and follow up plan given to pt and/or family. MARIA G Lea MD

## 2018-06-01 ENCOUNTER — APPOINTMENT (OUTPATIENT)
Dept: FAMILY MEDICINE | Facility: CLINIC | Age: 68
End: 2018-06-01
Payer: MEDICARE

## 2018-06-01 VITALS — TEMPERATURE: 98.3 F | DIASTOLIC BLOOD PRESSURE: 79 MMHG | SYSTOLIC BLOOD PRESSURE: 124 MMHG

## 2018-06-01 PROCEDURE — 99495 TRANSJ CARE MGMT MOD F2F 14D: CPT

## 2018-06-01 RX ORDER — GABAPENTIN 100 MG/1
100 CAPSULE ORAL
Qty: 60 | Refills: 0 | Status: DISCONTINUED | COMMUNITY
Start: 2018-05-18 | End: 2018-06-01

## 2018-06-12 ENCOUNTER — APPOINTMENT (OUTPATIENT)
Dept: CARDIOLOGY | Facility: CLINIC | Age: 68
End: 2018-06-12

## 2018-07-01 ENCOUNTER — OUTPATIENT (OUTPATIENT)
Dept: OUTPATIENT SERVICES | Facility: HOSPITAL | Age: 68
LOS: 1 days | End: 2018-07-01
Payer: MEDICARE

## 2018-07-01 DIAGNOSIS — Z95.5 PRESENCE OF CORONARY ANGIOPLASTY IMPLANT AND GRAFT: Chronic | ICD-10-CM

## 2018-07-17 DIAGNOSIS — Z71.89 OTHER SPECIFIED COUNSELING: ICD-10-CM

## 2018-07-17 PROBLEM — E11.9 TYPE 2 DIABETES MELLITUS WITHOUT COMPLICATIONS: Chronic | Status: ACTIVE | Noted: 2017-10-04

## 2018-07-17 PROBLEM — I49.9 CARDIAC ARRHYTHMIA, UNSPECIFIED: Chronic | Status: ACTIVE | Noted: 2017-10-04

## 2018-08-25 NOTE — ED ADULT NURSE NOTE - NS TRANSFER PATIENT BELONGINGS
Pt states 20 mins ago, her left L leg became \"dead weight.\" Denies any pain. Pt feels SOB as well. Pt not clear about situation before arrival.  at bedside.    Clothing

## 2018-09-04 ENCOUNTER — RX RENEWAL (OUTPATIENT)
Age: 68
End: 2018-09-04

## 2018-09-06 NOTE — ED ADULT TRIAGE NOTE - NS ED NOTE AC HIGH RISK COUNTRIES
57 year old female with ESRD on HD (MWF) previously with R chest permacath, now with LUE AV graft used for HD 9/1 and 9/5 with low flow. Patient markell be taken to the Graftogram tomorrow to re-assess graft.    Plan:  - Graftogram tomorow 9/7  - Possible D/C of right permacath during this week  - Rest of treatment per primary team No

## 2018-09-10 ENCOUNTER — APPOINTMENT (OUTPATIENT)
Dept: FAMILY MEDICINE | Facility: CLINIC | Age: 68
End: 2018-09-10
Payer: MEDICARE

## 2018-09-10 VITALS — DIASTOLIC BLOOD PRESSURE: 75 MMHG | SYSTOLIC BLOOD PRESSURE: 130 MMHG

## 2018-09-10 VITALS — OXYGEN SATURATION: 98 % | HEART RATE: 72 BPM

## 2018-09-10 LAB — HBA1C MFR BLD HPLC: 8.9

## 2018-09-10 PROCEDURE — G0009: CPT

## 2018-09-10 PROCEDURE — 90732 PPSV23 VACC 2 YRS+ SUBQ/IM: CPT

## 2018-09-10 PROCEDURE — 83036 HEMOGLOBIN GLYCOSYLATED A1C: CPT | Mod: QW

## 2018-09-10 PROCEDURE — 99214 OFFICE O/P EST MOD 30 MIN: CPT | Mod: 25

## 2018-09-17 ENCOUNTER — APPOINTMENT (OUTPATIENT)
Dept: DERMATOLOGY | Facility: CLINIC | Age: 68
End: 2018-09-17
Payer: MEDICARE

## 2018-09-17 VITALS — SYSTOLIC BLOOD PRESSURE: 130 MMHG | DIASTOLIC BLOOD PRESSURE: 78 MMHG

## 2018-09-17 PROCEDURE — 99213 OFFICE O/P EST LOW 20 MIN: CPT

## 2018-10-05 ENCOUNTER — APPOINTMENT (OUTPATIENT)
Dept: DERMATOLOGY | Facility: CLINIC | Age: 68
End: 2018-10-05

## 2018-10-05 ENCOUNTER — APPOINTMENT (OUTPATIENT)
Dept: ENDOCRINOLOGY | Facility: CLINIC | Age: 68
End: 2018-10-05
Payer: MEDICARE

## 2018-10-05 VITALS
HEART RATE: 67 BPM | OXYGEN SATURATION: 98 % | WEIGHT: 202 LBS | HEIGHT: 72 IN | BODY MASS INDEX: 27.36 KG/M2 | SYSTOLIC BLOOD PRESSURE: 120 MMHG | DIASTOLIC BLOOD PRESSURE: 80 MMHG

## 2018-10-05 PROCEDURE — 99215 OFFICE O/P EST HI 40 MIN: CPT

## 2018-10-06 ENCOUNTER — EMERGENCY (EMERGENCY)
Facility: HOSPITAL | Age: 68
LOS: 1 days | Discharge: HOME | End: 2018-10-06
Attending: EMERGENCY MEDICINE | Admitting: EMERGENCY MEDICINE
Payer: MEDICARE

## 2018-10-06 VITALS
SYSTOLIC BLOOD PRESSURE: 147 MMHG | HEART RATE: 66 BPM | OXYGEN SATURATION: 96 % | TEMPERATURE: 98 F | DIASTOLIC BLOOD PRESSURE: 108 MMHG | RESPIRATION RATE: 17 BRPM

## 2018-10-06 VITALS
OXYGEN SATURATION: 98 % | RESPIRATION RATE: 18 BRPM | TEMPERATURE: 98 F | HEART RATE: 62 BPM | DIASTOLIC BLOOD PRESSURE: 89 MMHG | SYSTOLIC BLOOD PRESSURE: 148 MMHG

## 2018-10-06 DIAGNOSIS — Z95.5 PRESENCE OF CORONARY ANGIOPLASTY IMPLANT AND GRAFT: Chronic | ICD-10-CM

## 2018-10-06 PROCEDURE — 72170 X-RAY EXAM OF PELVIS: CPT | Mod: 26,59

## 2018-10-06 PROCEDURE — 72170 X-RAY EXAM OF PELVIS: CPT

## 2018-10-06 PROCEDURE — 99284 EMERGENCY DEPT VISIT MOD MDM: CPT

## 2018-10-06 PROCEDURE — 99283 EMERGENCY DEPT VISIT LOW MDM: CPT

## 2018-10-06 PROCEDURE — 73522 X-RAY EXAM HIPS BI 3-4 VIEWS: CPT | Mod: 26

## 2018-10-06 PROCEDURE — 73522 X-RAY EXAM HIPS BI 3-4 VIEWS: CPT

## 2018-10-06 RX ORDER — TRAMADOL HYDROCHLORIDE 50 MG/1
50 TABLET ORAL ONCE
Qty: 0 | Refills: 0 | Status: DISCONTINUED | OUTPATIENT
Start: 2018-10-06 | End: 2018-10-06

## 2018-10-06 RX ORDER — ACETAMINOPHEN 500 MG
975 TABLET ORAL ONCE
Qty: 0 | Refills: 0 | Status: COMPLETED | OUTPATIENT
Start: 2018-10-06 | End: 2018-10-06

## 2018-10-06 RX ORDER — TRAMADOL HYDROCHLORIDE 50 MG/1
1 TABLET ORAL
Qty: 18 | Refills: 0 | OUTPATIENT
Start: 2018-10-06 | End: 2018-10-08

## 2018-10-06 RX ADMIN — Medication 975 MILLIGRAM(S): at 16:13

## 2018-10-06 NOTE — ED PROVIDER NOTE - SKIN, MLM
R Abdominal rash patchy, erythematous, peticcheal. R forearm slightly erythematous with visible healing scars.

## 2018-10-06 NOTE — ED PROVIDER NOTE - MUSCULOSKELETAL, MLM
Tenderness at iliac crest bilaterally. No lower extremity edema. Full ROM. Straight leg raise negative.

## 2018-10-06 NOTE — ED PROVIDER NOTE - ATTENDING CONTRIBUTION TO CARE
Patient with chief complaint of 10 day history of bilateral hip pain, aching, worse with walking, anterior hip and worse over points to region over iliac crest. Patient had hip pain then rash. no sick contacts or nausea/vomiting/diarrhea/constipation/shortness of breath/chest pain/fever/chills   ros (+ rash to right arm/elbow/forearm and torso)  patient tender over asis bilaterally  no other tenderness to palpation   neg straight leg test bilaterally

## 2018-10-06 NOTE — ED ADULT NURSE NOTE - OBJECTIVE STATEMENT
68 yr old male to ed c/o bilat hip pain after taking Metformin . Denies trauma or previous hip inj.  Diabetic, A1c 8. Alert and orientedx3 Resp even and nonlab Denies hest pain or sob. No obvious deformity or external rotation. Amb to ed. Denies fever or chills.

## 2018-10-06 NOTE — ED PROVIDER NOTE - NSFOLLOWUPINSTRUCTIONS_ED_ALL_ED_FT
Follow up with your medical doctor in 2-3 days or call our clinic at [phone] and state you were seen in the Emergency Department and would like to be seen in clinic. You may also call [phone] to speak with a representative to assist follow up care with medicine, surgery, or specialists.    Take Tramadol 50mg every 4 hours as needed for severe pain.     Drink at least 2 Liters or 64 Ounces of water each day (UNLESS you are supposed to restrict fluids or have a history of congestive heart failure (CHF)).    Return for any persistent, worsening symptoms, or ANY concerns at all.

## 2018-10-06 NOTE — ED ADULT NURSE NOTE - NSIMPLEMENTINTERV_GEN_ALL_ED
Implemented All Universal Safety Interventions:  [place] to call system. Call bell, personal items and telephone within reach. Instruct patient to call for assistance. Room bathroom lighting operational. Non-slip footwear when patient is off stretcher. Physically safe environment: no spills, clutter or unnecessary equipment. Stretcher in lowest position, wheels locked, appropriate side rails in place.

## 2018-10-06 NOTE — ED PROVIDER NOTE - OBJECTIVE STATEMENT
68 year old male with past medical history of Type II DM and CAD s/p 2 stents who presents with a 10 day history of bilateral hip pain. The aching pain is present at rest but is worsened by walking. The patient had trouble sleeping last night and took Tylenol which did not help his symptoms. The R hip is more painful than the L. The pain does not radiate. He denies numbness/tingling.     He also has a erythematous rash that began on his R forearm and then R trunk; he describes it as very itchy, initially vesicular (expressing fluid) and then becoming an itchy flat red rash. He notes that he has had "itchy bumps" on his other arm and legs as well. The rash began after his bilateral hip pain.

## 2018-10-10 ENCOUNTER — CLINICAL ADVICE (OUTPATIENT)
Age: 68
End: 2018-10-10

## 2018-10-11 ENCOUNTER — TRANSCRIPTION ENCOUNTER (OUTPATIENT)
Age: 68
End: 2018-10-11

## 2018-10-11 ENCOUNTER — APPOINTMENT (OUTPATIENT)
Dept: FAMILY MEDICINE | Facility: CLINIC | Age: 68
End: 2018-10-11
Payer: MEDICARE

## 2018-10-11 VITALS — TEMPERATURE: 98.2 F | DIASTOLIC BLOOD PRESSURE: 70 MMHG | SYSTOLIC BLOOD PRESSURE: 130 MMHG

## 2018-10-11 PROCEDURE — 99214 OFFICE O/P EST MOD 30 MIN: CPT

## 2018-10-16 ENCOUNTER — APPOINTMENT (OUTPATIENT)
Dept: ENDOCRINOLOGY | Facility: CLINIC | Age: 68
End: 2018-10-16
Payer: MEDICARE

## 2018-10-16 PROCEDURE — G0108 DIAB MANAGE TRN  PER INDIV: CPT

## 2018-10-18 ENCOUNTER — APPOINTMENT (OUTPATIENT)
Dept: ORTHOPEDIC SURGERY | Facility: CLINIC | Age: 68
End: 2018-10-18
Payer: MEDICARE

## 2018-10-18 VITALS
DIASTOLIC BLOOD PRESSURE: 75 MMHG | HEART RATE: 76 BPM | BODY MASS INDEX: 26.82 KG/M2 | HEIGHT: 72 IN | WEIGHT: 198 LBS | SYSTOLIC BLOOD PRESSURE: 128 MMHG

## 2018-10-18 DIAGNOSIS — Z86.79 PERSONAL HISTORY OF OTHER DISEASES OF THE CIRCULATORY SYSTEM: ICD-10-CM

## 2018-10-18 DIAGNOSIS — Z78.9 OTHER SPECIFIED HEALTH STATUS: ICD-10-CM

## 2018-10-18 DIAGNOSIS — Z60.2 PROBLEMS RELATED TO LIVING ALONE: ICD-10-CM

## 2018-10-18 PROCEDURE — 99204 OFFICE O/P NEW MOD 45 MIN: CPT

## 2018-10-18 PROCEDURE — 72100 X-RAY EXAM L-S SPINE 2/3 VWS: CPT

## 2018-10-18 SDOH — SOCIAL STABILITY - SOCIAL INSECURITY: PROBLEMS RELATED TO LIVING ALONE: Z60.2

## 2018-10-25 ENCOUNTER — APPOINTMENT (OUTPATIENT)
Dept: OPHTHALMOLOGY | Facility: CLINIC | Age: 68
End: 2018-10-25
Payer: MEDICAID

## 2018-10-25 PROCEDURE — 92225: CPT | Mod: LT

## 2018-10-25 PROCEDURE — 92134 CPTRZ OPH DX IMG PST SGM RTA: CPT

## 2018-10-25 PROCEDURE — 92004 COMPRE OPH EXAM NEW PT 1/>: CPT

## 2018-10-28 ENCOUNTER — TRANSCRIPTION ENCOUNTER (OUTPATIENT)
Age: 68
End: 2018-10-28

## 2018-11-02 ENCOUNTER — APPOINTMENT (OUTPATIENT)
Dept: FAMILY MEDICINE | Facility: CLINIC | Age: 68
End: 2018-11-02
Payer: MEDICARE

## 2018-11-02 VITALS — TEMPERATURE: 98.4 F | SYSTOLIC BLOOD PRESSURE: 120 MMHG | DIASTOLIC BLOOD PRESSURE: 80 MMHG

## 2018-11-02 PROCEDURE — 99214 OFFICE O/P EST MOD 30 MIN: CPT

## 2018-11-19 ENCOUNTER — LABORATORY RESULT (OUTPATIENT)
Age: 68
End: 2018-11-19

## 2018-11-20 ENCOUNTER — APPOINTMENT (OUTPATIENT)
Dept: ENDOCRINOLOGY | Facility: CLINIC | Age: 68
End: 2018-11-20
Payer: MEDICARE

## 2018-11-20 PROCEDURE — 95251 CONT GLUC MNTR ANALYSIS I&R: CPT

## 2018-11-20 PROCEDURE — G0108 DIAB MANAGE TRN  PER INDIV: CPT

## 2018-11-23 ENCOUNTER — APPOINTMENT (OUTPATIENT)
Dept: OPHTHALMOLOGY | Facility: CLINIC | Age: 68
End: 2018-11-23

## 2018-12-05 ENCOUNTER — RX RENEWAL (OUTPATIENT)
Age: 68
End: 2018-12-05

## 2018-12-06 ENCOUNTER — RX RENEWAL (OUTPATIENT)
Age: 68
End: 2018-12-06

## 2018-12-10 ENCOUNTER — APPOINTMENT (OUTPATIENT)
Dept: FAMILY MEDICINE | Facility: CLINIC | Age: 68
End: 2018-12-10
Payer: MEDICAID

## 2018-12-10 VITALS — TEMPERATURE: 98.6 F | SYSTOLIC BLOOD PRESSURE: 118 MMHG | DIASTOLIC BLOOD PRESSURE: 70 MMHG

## 2018-12-10 LAB — HBA1C MFR BLD HPLC: 7.9

## 2018-12-10 PROCEDURE — 83036 HEMOGLOBIN GLYCOSYLATED A1C: CPT | Mod: QW

## 2018-12-10 PROCEDURE — 99214 OFFICE O/P EST MOD 30 MIN: CPT | Mod: 25

## 2018-12-10 RX ORDER — GABAPENTIN 100 MG/1
100 CAPSULE ORAL 3 TIMES DAILY
Qty: 90 | Refills: 0 | Status: DISCONTINUED | COMMUNITY
Start: 2018-10-18 | End: 2018-12-10

## 2019-01-18 ENCOUNTER — APPOINTMENT (OUTPATIENT)
Dept: ENDOCRINOLOGY | Facility: CLINIC | Age: 69
End: 2019-01-18
Payer: MEDICARE

## 2019-01-18 VITALS
SYSTOLIC BLOOD PRESSURE: 120 MMHG | WEIGHT: 204 LBS | DIASTOLIC BLOOD PRESSURE: 80 MMHG | BODY MASS INDEX: 27.63 KG/M2 | HEART RATE: 62 BPM | OXYGEN SATURATION: 96 % | HEIGHT: 72 IN

## 2019-01-18 PROCEDURE — 99214 OFFICE O/P EST MOD 30 MIN: CPT

## 2019-01-18 RX ORDER — FLASH GLUCOSE SCANNING READER
EACH MISCELLANEOUS
Qty: 1 | Refills: 0 | Status: ACTIVE | COMMUNITY
Start: 2019-01-18 | End: 1900-01-01

## 2019-01-18 RX ORDER — FLASH GLUCOSE SENSOR
KIT MISCELLANEOUS
Qty: 3 | Refills: 2 | Status: DISCONTINUED | COMMUNITY
Start: 2018-04-10 | End: 2019-01-18

## 2019-01-18 RX ORDER — FLASH GLUCOSE SENSOR
KIT MISCELLANEOUS
Qty: 2 | Refills: 5 | Status: ACTIVE | COMMUNITY
Start: 2019-01-18 | End: 1900-01-01

## 2019-01-18 RX ORDER — FLASH GLUCOSE SENSOR
KIT MISCELLANEOUS
Qty: 1 | Refills: 0 | Status: DISCONTINUED | COMMUNITY
Start: 2018-04-10 | End: 2019-01-18

## 2019-02-15 ENCOUNTER — APPOINTMENT (OUTPATIENT)
Dept: ENDOCRINOLOGY | Facility: CLINIC | Age: 69
End: 2019-02-15
Payer: MEDICARE

## 2019-02-15 PROCEDURE — G0108 DIAB MANAGE TRN  PER INDIV: CPT

## 2019-02-20 ENCOUNTER — RX RENEWAL (OUTPATIENT)
Age: 69
End: 2019-02-20

## 2019-02-22 ENCOUNTER — RX RENEWAL (OUTPATIENT)
Age: 69
End: 2019-02-22

## 2019-02-22 RX ORDER — CLOPIDOGREL BISULFATE 75 MG/1
75 TABLET, FILM COATED ORAL
Qty: 90 | Refills: 1 | Status: ACTIVE | COMMUNITY
Start: 1900-01-01 | End: 1900-01-01

## 2019-02-25 ENCOUNTER — RX RENEWAL (OUTPATIENT)
Age: 69
End: 2019-02-25

## 2019-03-15 ENCOUNTER — APPOINTMENT (OUTPATIENT)
Dept: ENDOCRINOLOGY | Facility: CLINIC | Age: 69
End: 2019-03-15
Payer: MEDICARE

## 2019-03-15 PROCEDURE — G0108 DIAB MANAGE TRN  PER INDIV: CPT

## 2019-03-22 ENCOUNTER — NON-APPOINTMENT (OUTPATIENT)
Age: 69
End: 2019-03-22

## 2019-03-22 ENCOUNTER — APPOINTMENT (OUTPATIENT)
Dept: FAMILY MEDICINE | Facility: CLINIC | Age: 69
End: 2019-03-22
Payer: MEDICARE

## 2019-03-22 VITALS
HEIGHT: 72 IN | WEIGHT: 196 LBS | RESPIRATION RATE: 14 BRPM | TEMPERATURE: 98.2 F | SYSTOLIC BLOOD PRESSURE: 128 MMHG | DIASTOLIC BLOOD PRESSURE: 70 MMHG | HEART RATE: 69 BPM | OXYGEN SATURATION: 97 % | BODY MASS INDEX: 26.55 KG/M2

## 2019-03-22 PROCEDURE — 93000 ELECTROCARDIOGRAM COMPLETE: CPT

## 2019-03-22 PROCEDURE — G0439: CPT | Mod: 25

## 2019-03-27 LAB
25(OH)D3 SERPL-MCNC: 25.3 NG/ML
ALBUMIN SERPL ELPH-MCNC: 4.7 G/DL
ALP BLD-CCNC: 53 U/L
ALT SERPL-CCNC: 20 U/L
ANION GAP SERPL CALC-SCNC: 13 MMOL/L
APPEARANCE: CLEAR
AST SERPL-CCNC: 18 U/L
BACTERIA UR CULT: NORMAL
BACTERIA: NEGATIVE
BASOPHILS # BLD AUTO: 0.04 K/UL
BASOPHILS NFR BLD AUTO: 0.9 %
BILIRUB SERPL-MCNC: 0.6 MG/DL
BILIRUBIN URINE: NEGATIVE
BLOOD URINE: NEGATIVE
BUN SERPL-MCNC: 21 MG/DL
CALCIUM SERPL-MCNC: 9.7 MG/DL
CHLORIDE SERPL-SCNC: 104 MMOL/L
CHOLEST SERPL-MCNC: 190 MG/DL
CHOLEST/HDLC SERPL: 3.1 RATIO
CO2 SERPL-SCNC: 22 MMOL/L
COLOR: YELLOW
CREAT SERPL-MCNC: 1.17 MG/DL
EOSINOPHIL # BLD AUTO: 0.06 K/UL
EOSINOPHIL NFR BLD AUTO: 1.3 %
ESTIMATED AVERAGE GLUCOSE: 200 MG/DL
FOLATE SERPL-MCNC: 16.8 NG/ML
GGT SERPL-CCNC: 20 U/L
GLUCOSE QUALITATIVE U: NEGATIVE
GLUCOSE SERPL-MCNC: 233 MG/DL
HBA1C MFR BLD HPLC: 8.6 %
HCT VFR BLD CALC: 46 %
HCV RNA SERPL NAA DL=5-ACNC: NOT DETECTED IU/ML
HCV RNA SERPL NAA+PROBE-LOG IU: NOT DETECTED LOGIU/ML
HDLC SERPL-MCNC: 62 MG/DL
HGB BLD-MCNC: 14.3 G/DL
HYALINE CASTS: 0 /LPF
IMM GRANULOCYTES NFR BLD AUTO: 0.2 %
KETONES URINE: ABNORMAL
LDLC SERPL CALC-MCNC: 106 MG/DL
LEUKOCYTE ESTERASE URINE: NEGATIVE
LYMPHOCYTES # BLD AUTO: 1.17 K/UL
LYMPHOCYTES NFR BLD AUTO: 26 %
MAN DIFF?: NORMAL
MCHC RBC-ENTMCNC: 29.7 PG
MCHC RBC-ENTMCNC: 31.1 GM/DL
MCV RBC AUTO: 95.4 FL
MICROSCOPIC-UA: NORMAL
MONOCYTES # BLD AUTO: 0.4 K/UL
MONOCYTES NFR BLD AUTO: 8.9 %
NEUTROPHILS # BLD AUTO: 2.82 K/UL
NEUTROPHILS NFR BLD AUTO: 62.7 %
NITRITE URINE: NEGATIVE
PH URINE: 6
PLATELET # BLD AUTO: 235 K/UL
POTASSIUM SERPL-SCNC: 4.8 MMOL/L
PROT SERPL-MCNC: 6.7 G/DL
PROTEIN URINE: NORMAL
RBC # BLD: 4.82 M/UL
RBC # FLD: 12.7 %
RED BLOOD CELLS URINE: 1 /HPF
SODIUM SERPL-SCNC: 139 MMOL/L
SPECIFIC GRAVITY URINE: 1.02
SQUAMOUS EPITHELIAL CELLS: 0 /HPF
T4 FREE SERPL-MCNC: 1 NG/DL
T4 SERPL-MCNC: 5.2 UG/DL
TRIGL SERPL-MCNC: 111 MG/DL
TSH SERPL-ACNC: 2.66 UIU/ML
UROBILINOGEN URINE: NORMAL
VIT B12 SERPL-MCNC: 973 PG/ML
WBC # FLD AUTO: 4.5 K/UL
WHITE BLOOD CELLS URINE: 1 /HPF

## 2019-04-29 ENCOUNTER — APPOINTMENT (OUTPATIENT)
Dept: ENDOCRINOLOGY | Facility: CLINIC | Age: 69
End: 2019-04-29
Payer: MEDICARE

## 2019-04-29 VITALS
HEART RATE: 80 BPM | WEIGHT: 195 LBS | DIASTOLIC BLOOD PRESSURE: 80 MMHG | BODY MASS INDEX: 26.41 KG/M2 | SYSTOLIC BLOOD PRESSURE: 130 MMHG | OXYGEN SATURATION: 98 % | HEIGHT: 72 IN

## 2019-04-29 PROCEDURE — 99214 OFFICE O/P EST MOD 30 MIN: CPT

## 2019-04-29 RX ORDER — METOPROLOL TARTRATE 25 MG/1
25 TABLET, FILM COATED ORAL
Qty: 90 | Refills: 1 | Status: ACTIVE | COMMUNITY
Start: 2019-02-25 | End: 1900-01-01

## 2019-04-29 NOTE — THERAPY
[FreeTextEntry2] : Lantus 26 units\par Humalog 6 units for breakfast, Lunch 4-6 units with lunch, and dinner 8 units\par ISF 1:100mg/dl

## 2019-04-29 NOTE — HISTORY OF PRESENT ILLNESS
[FreeTextEntry1] : Patient Is a 68-year-old Male with History of Hypertension, Hyperlipidemia, Type II Diabetes Mellitus, here for followup visit.\par He was diagnosed with type II diabetes mellitus many years ago. \par \par He is using Adelso sensor which he likes. \par But he is having quiet a lot of hypoglycemia.  See see adelso download. \par His dog of 16 years  recently.  Walking his dog has been very important for him, it keeps him active and makes him very happy.  He was very tearful during our visit.  His dog's name was Good.  \par \par His daughter lives in [place] and son lives in [place]. \par He is a retired .  He's from [place].  He is up-to-date with his ophthalmologist.  He is up to date with ophthalmologist.  \par He is up-to-date with his podiatrist.\par \par He had a history of cardiovascular disease status post cardiac stents.  History of CHF or cerebral vascular accidents.\par The following is a sample of patient’s daily diet/routine: RETIRED, .  \par 6am - coffee and a small avocado, one slide whole bread and "zero carb" milk. \par 8am - one slide of bread.  \par Lunch: beef or pork stew, sometimes chicken stew, one slice whole wheat bread. \par Dinner: just eats a salad with very little carb with dinner.   \par Current exercise: he walks about 2 miles in the morning 2 miles in the evening.  Most days.  \par Weight trend: Stable.  \par \par In regards to His DM health maintenance: \par Last visit to ophthalmology was: Date -Oct 2018\par Last visit to podiatry was: Date - 3/2018\par The patient is adherent to diabetic foot precautions:Yes\par Last A1c was: 2018 9.2% --> 9/10/2018 8.9% --> 12/10/2018 7.9% --> 2019 8.6%\par Last LDL was: 2018 95 mg/dl\par Last urine micro-albumin was:2018 negative. \par \par Regarding hypertension: he current takes isosorbide 30mg daily, Lisinopril 5mg, metoprolol 25mg daily. He reports adherence to his medications.\par \par Regarding hyperlipidemia, he is currently on high potency statin.  He reports no side effects including myalgias. \par

## 2019-04-29 NOTE — HISTORY OF PRESENT ILLNESS
[FreeTextEntry1] : Patient Is a 68-year-old Male with History of Hypertension, Hyperlipidemia, Type II Diabetes Mellitus, here for followup visit.\par He was diagnosed with type II diabetes mellitus many years ago. \par \par He is using Adelso sensor which he likes. \par But he is having quiet a lot of hypoglycemia.  See see adelso download. \par His dog of 16 years  recently.  Walking his dog has been very important for him, it keeps him active and makes him very happy.  He was very tearful during our visit.  His dog's name was Good.  \par \par His daughter lives in [place] and son lives in [place]. \par He is a retired .  He's from [place].  He is up-to-date with his ophthalmologist.  He is up to date with ophthalmologist.  \par He is up-to-date with his podiatrist.\par \par He had a history of cardiovascular disease status post cardiac stents.  History of CHF or cerebral vascular accidents.\par The following is a sample of patient’s daily diet/routine: RETIRED, .  \par 6am - coffee and a small avocado, one slide whole bread and "zero carb" milk. \par 8am - one slide of bread.  \par Lunch: beef or pork stew, sometimes chicken stew, one slice whole wheat bread. \par Dinner: just eats a salad with very little carb with dinner.   \par Current exercise: he walks about 2 miles in the morning 2 miles in the evening.  Most days.  \par Weight trend: Stable.  \par \par In regards to His DM health maintenance: \par Last visit to ophthalmology was: Date -Oct 2018\par Last visit to podiatry was: Date - 3/2018\par The patient is adherent to diabetic foot precautions:Yes\par Last A1c was: 2018 9.2% --> 9/10/2018 8.9% --> 12/10/2018 7.9% --> 2019 8.6%\par Last LDL was: 2018 95 mg/dl\par Last urine micro-albumin was:2018 negative. \par \par Regarding hypertension: he current takes isosorbide 30mg daily, Lisinopril 5mg, metoprolol 25mg daily. He reports adherence to his medications.\par \par Regarding hyperlipidemia, he is currently on high potency statin.  He reports no side effects including myalgias. \par

## 2019-04-29 NOTE — ASSESSMENT
[FreeTextEntry1] : Mr. GLORIA BIRCH is a 67 year old M here for evaluation and treatment of diabetes mellitus type II. Type 2 diabetes complicated by atherosclerosis status post cardiac stents.\par \par 1)Diabetes, type 2, Poorly controlled with A1c 8.9% in Sept 2018\par Please see GERRY DOWNLOAD, patient has been giving himself Humalog a few hours before he eats his "second breakfast", leading to hyperglycemia when the glucose rise after his meal.  Will increase basal insulin.  \par Lantus 28 unit at bedtime. \par Humalog sliding scale \par BREAKFAST \par  mg/dl: Take 8\par 151-200 mg/dl: Take 9\par 201-250 mg/dl: Take 9\par 251-300 mg/dl: Take 10\par 301-350 mg/dl: Take 10\par 351-400 mg/dl: Take 11\par Above 401 mg/dl: Take 11\par LUNCH:\par If sugar sugar is: \par  mg/dl: 4-6\par 151-200 mg/dl: Take 5-7\par 201-250 mg/dl: Take 5-7 \par 251-300 mg/dl: Take 6-8\par 301-350 mg/dl: Take 6-8\par 351-400 mg/dl: Take 7-9\par Above 401 mg/dl: Take 7-9\par Dinner: \par If sugar sugar is: \par  mg/dl: take 8\par 151-200 mg/dl: Take 9\par 201-250 mg/dl: Take 9 \par 251-300 mg/dl: Take 10 \par 301-350 mg/dl: Take 10\par 351-400 mg/dl: Take 11 \par Above 401 mg/dl: Take 11\par SUMMARY: \par Humalog 8/4-6/8 (1:100 above 150mg/dl) \par Metformin 500mg bid \par Metformin and GFR. \par Patient's most recent EGFR was  64 ml/min/1.76\par > 60: No renal contraindication to metformin. Monitor renal function annually\par  \par \par 2)Blood Pressure His blood pressure today is good \par -Recommendation to continue with his current treatment.\par Renal status:  urine microalbumin June 2019\par \par 3)Cholesterol management, \par Given history of CAD with cardiac stents. His LDL goal ideally is then 70 mg/dL.\par -Recommendation to continue with high potency statin of atorvastatin 80 mg daily.\par -Recommend to continue with aspirin and Plavix therapy\par \par Diabetes Health Care Maintenance\par - Opthalmology up to date: Yes\par -Podiatry up to date: yes\par -Antiplatelet agent: Yes\par  -Urine microalbumin: April 2018, negative \par -ACE-I/ARB: Yes\par -Patient to call for persistent glucose < 70 or > 300\par -Discussed hypoglycemic protocol.  \par -Yearly flu vaccine recommended \par \par EDUCATION: Reviewed 'ABC' of diabetes management (respective goals in parentheses): A1C (<7), blood pressure (<140/90), and cholesterol (LDL <100).\par \par COMPLIANCE at present is estimated to be:  Good\par FOLLOW UP: I recommend that frequency of visits for diabetes care be every 3 month \par \par \par  [Carbohydrate Consistent Diet] : carbohydrate consistent diet [Hypoglycemia Management] : hypoglycemia management [Diabetes Foot Care] : diabetes foot care [Long Term Vascular Complications] : long term vascular complications of diabetes [Importance of Diet and Exercise] : importance of diet and exercise to improve glycemic control, achieve weight loss and improve cardiovascular health [Action and use of Insulin] : action and use of short and long-acting insulin [Self Monitoring of Blood Glucose] : self monitoring of blood glucose [Insulin Self-Administration] : insulin self-administration [Injection Technique, Storage, Sharps Disposal] : injection technique, storage, and sharps disposal [Retinopathy Screening] : Patient was referred to ophthalmology for retinopathy screening

## 2019-06-14 ENCOUNTER — APPOINTMENT (OUTPATIENT)
Dept: FAMILY MEDICINE | Facility: CLINIC | Age: 69
End: 2019-06-14
Payer: MEDICARE

## 2019-06-14 VITALS — SYSTOLIC BLOOD PRESSURE: 132 MMHG | TEMPERATURE: 97.8 F | DIASTOLIC BLOOD PRESSURE: 70 MMHG

## 2019-06-14 PROCEDURE — 36415 COLL VENOUS BLD VENIPUNCTURE: CPT

## 2019-06-14 PROCEDURE — 99214 OFFICE O/P EST MOD 30 MIN: CPT | Mod: 25

## 2019-06-17 ENCOUNTER — RX RENEWAL (OUTPATIENT)
Age: 69
End: 2019-06-17

## 2019-06-17 LAB
ANION GAP SERPL CALC-SCNC: 12 MMOL/L
BUN SERPL-MCNC: 27 MG/DL
CALCIUM SERPL-MCNC: 9.5 MG/DL
CHLORIDE SERPL-SCNC: 101 MMOL/L
CO2 SERPL-SCNC: 26 MMOL/L
CREAT SERPL-MCNC: 1.18 MG/DL
CREAT SPEC-SCNC: 51 MG/DL
ESTIMATED AVERAGE GLUCOSE: 197 MG/DL
GLUCOSE SERPL-MCNC: 203 MG/DL
HBA1C MFR BLD HPLC: 8.5 %
MICROALBUMIN 24H UR DL<=1MG/L-MCNC: <1.2 MG/DL
MICROALBUMIN/CREAT 24H UR-RTO: NORMAL MG/G
POTASSIUM SERPL-SCNC: 5 MMOL/L
SODIUM SERPL-SCNC: 139 MMOL/L

## 2019-07-02 ENCOUNTER — APPOINTMENT (OUTPATIENT)
Dept: ENDOCRINOLOGY | Facility: CLINIC | Age: 69
End: 2019-07-02
Payer: MEDICARE

## 2019-07-02 PROCEDURE — 95251 CONT GLUC MNTR ANALYSIS I&R: CPT

## 2019-07-02 PROCEDURE — G0108 DIAB MANAGE TRN  PER INDIV: CPT

## 2019-07-10 ENCOUNTER — RX RENEWAL (OUTPATIENT)
Age: 69
End: 2019-07-10

## 2019-07-12 ENCOUNTER — CLINICAL ADVICE (OUTPATIENT)
Age: 69
End: 2019-07-12

## 2019-07-16 ENCOUNTER — APPOINTMENT (OUTPATIENT)
Dept: FAMILY MEDICINE | Facility: CLINIC | Age: 69
End: 2019-07-16
Payer: MEDICARE

## 2019-07-16 VITALS — SYSTOLIC BLOOD PRESSURE: 130 MMHG | TEMPERATURE: 98.1 F | DIASTOLIC BLOOD PRESSURE: 80 MMHG

## 2019-07-16 PROCEDURE — 99214 OFFICE O/P EST MOD 30 MIN: CPT

## 2019-08-23 ENCOUNTER — APPOINTMENT (OUTPATIENT)
Dept: ENDOCRINOLOGY | Facility: CLINIC | Age: 69
End: 2019-08-23

## 2019-09-10 ENCOUNTER — RX RENEWAL (OUTPATIENT)
Age: 69
End: 2019-09-10

## 2019-09-17 ENCOUNTER — APPOINTMENT (OUTPATIENT)
Dept: FAMILY MEDICINE | Facility: CLINIC | Age: 69
End: 2019-09-17

## 2019-10-02 PROBLEM — Z60.2 PERSON LIVING ALONE: Status: ACTIVE | Noted: 2018-10-18

## 2020-01-08 NOTE — ED PROVIDER NOTE - CPE EDP SKIN NORM
· Lifestyle modifications are recommended including weight loss, improving her diet, and increasing her amount of activity    · She will need an outpatient sleep study completed as soon as possible to check for obstructive sleep apnea  · Outpatient Bariatric Surgery evaluation normal...

## 2020-02-01 PROCEDURE — G9005: CPT

## 2020-02-24 NOTE — ED ADULT NURSE NOTE - CAS ELECT INFOMATION PROVIDED
[address]                  [address] [place],  [facility]                            CARDIAC CATHETERIZATION    PATIENT NAME: Bailee Stock                      :        1952  MED REC NO:   [id number]                            ROOM:  ACCOUNT NO:   [de-identified]                           ADMIT DATE: 2020  PROVIDER:     Dwayne Gates MD    DATE OF PROCEDURE:  2020    PROCEDURES:  Left heart catheterization, selective coronary angiography,  and left ventriculography. The procedure was done through right radial approach using ultrasound  guidance. The patient received intravenous Versed and intravenous fentanyl for  sedation. INDICATION:  Chest pain and positive cardiac enzymes suggestive of  non-ST-elevation myocardial infarction. PRESSURES:  1. Aorta 108/79 with a mean of 93.  2.  Left ventricular systolic pressure 847. Left ventricular  end-diastolic pressure 13.  3.  There was no gradient across the aortic valve. CORONARY ANGIOGRAPHY:  Left main:  The left main artery did not appear to have any significant  angiographic disease. LAD:  The left anterior descending artery had around 10% to 20%  narrowing just after the origin of the first septal  branch  and the first diagonal branch. The distal LAD became a small caliber  vessel measuring less than 1.5 mm in diameter. The large diagonal  branch and the septal  branch did not appear to have any  significant angiographic disease. LCX:  The left circumflex did not appear to have any significant  angiographic disease. RCA:  The right coronary artery is a dominant vessel which was a very  large vessel and which did not appear to have any angiographic disease. LEFT VENTRICULOGRAPHY:  The left ventricle is normal in size. There was  severe hypokinesis of a large apical area extending from the mid  anterolateral wall, the apex, and the mid inferior wall. DC instructions

## 2020-08-20 NOTE — ED ADULT NURSE NOTE - CAS TRG GENERAL NORM CIRC DET
KEVIN/MARY Discharge Plan  Informed patient is ready for discharge. Patient’s discharge destination is Home/apartment. Patient to be picked up by family car  .  Patient/interested person has been counseled for post hospitalization care.   . Initial implementation of the patient’s discharge plan has been arranged, including any devices/equipment needed for discharge. Discharge plan communicated to MD, RN, TEODORO, MARY, KEVIN and Receiving Facility/Agency.    After Visit Summary - Transition Report Information  Receiving Agency/Facility: [facility]  Receiving Agency/Facility phone number: [phone]  Receiving Agency/Facility Type: Home Health/Hospice     Please refer to DEBRA Norton's notes for complete details re: d/c home today with [facility]   Strong peripheral pulses

## 2021-02-20 ENCOUNTER — TRANSCRIPTION ENCOUNTER (OUTPATIENT)
Age: 71
End: 2021-02-20

## 2021-03-01 ENCOUNTER — APPOINTMENT (OUTPATIENT)
Dept: VASCULAR SURGERY | Facility: CLINIC | Age: 71
End: 2021-03-01

## 2021-09-05 ENCOUNTER — TRANSCRIPTION ENCOUNTER (OUTPATIENT)
Age: 71
End: 2021-09-05

## 2021-11-18 ENCOUNTER — APPOINTMENT (OUTPATIENT)
Dept: FAMILY MEDICINE | Facility: CLINIC | Age: 71
End: 2021-11-18
Payer: MEDICARE

## 2021-11-18 ENCOUNTER — NON-APPOINTMENT (OUTPATIENT)
Age: 71
End: 2021-11-18

## 2021-11-18 VITALS
TEMPERATURE: 97.8 F | HEART RATE: 63 BPM | BODY MASS INDEX: 28.17 KG/M2 | OXYGEN SATURATION: 97 % | RESPIRATION RATE: 14 BRPM | SYSTOLIC BLOOD PRESSURE: 142 MMHG | WEIGHT: 208 LBS | DIASTOLIC BLOOD PRESSURE: 78 MMHG | HEIGHT: 72 IN

## 2021-11-18 DIAGNOSIS — Z87.39 PERSONAL HISTORY OF OTHER DISEASES OF THE MUSCULOSKELETAL SYSTEM AND CONNECTIVE TISSUE: ICD-10-CM

## 2021-11-18 DIAGNOSIS — R73.09 OTHER ABNORMAL GLUCOSE: ICD-10-CM

## 2021-11-18 DIAGNOSIS — M25.562 PAIN IN LEFT KNEE: ICD-10-CM

## 2021-11-18 DIAGNOSIS — I73.9 PERIPHERAL VASCULAR DISEASE, UNSPECIFIED: ICD-10-CM

## 2021-11-18 DIAGNOSIS — Z92.29 PERSONAL HISTORY OF OTHER DRUG THERAPY: ICD-10-CM

## 2021-11-18 DIAGNOSIS — R74.8 ABNORMAL LEVELS OF OTHER SERUM ENZYMES: ICD-10-CM

## 2021-11-18 DIAGNOSIS — Z09 ENCOUNTER FOR FOLLOW-UP EXAMINATION AFTER COMPLETED TREATMENT FOR CONDITIONS OTHER THAN MALIGNANT NEOPLASM: ICD-10-CM

## 2021-11-18 DIAGNOSIS — Z87.2 PERSONAL HISTORY OF DISEASES OF THE SKIN AND SUBCUTANEOUS TISSUE: ICD-10-CM

## 2021-11-18 DIAGNOSIS — H92.02 OTALGIA, LEFT EAR: ICD-10-CM

## 2021-11-18 DIAGNOSIS — R94.5 ABNORMAL RESULTS OF LIVER FUNCTION STUDIES: ICD-10-CM

## 2021-11-18 DIAGNOSIS — H81.13 BENIGN PAROXYSMAL VERTIGO, BILATERAL: ICD-10-CM

## 2021-11-18 DIAGNOSIS — H93.12 TINNITUS, LEFT EAR: ICD-10-CM

## 2021-11-18 DIAGNOSIS — M79.662 PAIN IN LEFT LOWER LEG: ICD-10-CM

## 2021-11-18 DIAGNOSIS — M25.551 PAIN IN RIGHT HIP: ICD-10-CM

## 2021-11-18 DIAGNOSIS — M25.559 PAIN IN UNSPECIFIED HIP: ICD-10-CM

## 2021-11-18 DIAGNOSIS — L70.0 ACNE VULGARIS: ICD-10-CM

## 2021-11-18 DIAGNOSIS — R21 RASH AND OTHER NONSPECIFIC SKIN ERUPTION: ICD-10-CM

## 2021-11-18 DIAGNOSIS — M47.814 SPONDYLOSIS W/OUT MYELOPATHY OR RADICULOPATHY, THORACIC REGION: ICD-10-CM

## 2021-11-18 DIAGNOSIS — E11.40 TYPE 2 DIABETES MELLITUS WITH DIABETIC NEUROPATHY, UNSPECIFIED: ICD-10-CM

## 2021-11-18 DIAGNOSIS — Z00.00 ENCOUNTER FOR GENERAL ADULT MEDICAL EXAMINATION W/OUT ABNORMAL FINDINGS: ICD-10-CM

## 2021-11-18 LAB
ALBUMIN SERPL ELPH-MCNC: 4.2 G/DL
ALP BLD-CCNC: 70 U/L
ALT SERPL-CCNC: 27 U/L
ANION GAP SERPL CALC-SCNC: 12 MMOL/L
AST SERPL-CCNC: 18 U/L
BASOPHILS # BLD AUTO: 0.04 K/UL
BASOPHILS NFR BLD AUTO: 1.1 %
BILIRUB SERPL-MCNC: 0.5 MG/DL
BUN SERPL-MCNC: 20 MG/DL
CALCIUM SERPL-MCNC: 8.9 MG/DL
CHLORIDE SERPL-SCNC: 106 MMOL/L
CHOLEST SERPL-MCNC: 153 MG/DL
CO2 SERPL-SCNC: 22 MMOL/L
CREAT SERPL-MCNC: 1 MG/DL
EOSINOPHIL # BLD AUTO: 0.09 K/UL
EOSINOPHIL NFR BLD AUTO: 2.5 %
ESTIMATED AVERAGE GLUCOSE: 235 MG/DL
FOLATE SERPL-MCNC: 11.9 NG/ML
GLUCOSE SERPL-MCNC: 182 MG/DL
HBA1C MFR BLD HPLC: 9.8 %
HCT VFR BLD CALC: 42.4 %
HDLC SERPL-MCNC: 60 MG/DL
HGB BLD-MCNC: 13.5 G/DL
IMM GRANULOCYTES NFR BLD AUTO: 0.3 %
LDLC SERPL CALC-MCNC: 77 MG/DL
LYMPHOCYTES # BLD AUTO: 1.12 K/UL
LYMPHOCYTES NFR BLD AUTO: 31 %
MAN DIFF?: NORMAL
MCHC RBC-ENTMCNC: 29.6 PG
MCHC RBC-ENTMCNC: 31.8 GM/DL
MCV RBC AUTO: 93 FL
MONOCYTES # BLD AUTO: 0.36 K/UL
MONOCYTES NFR BLD AUTO: 10 %
NEUTROPHILS # BLD AUTO: 1.99 K/UL
NEUTROPHILS NFR BLD AUTO: 55.1 %
NONHDLC SERPL-MCNC: 93 MG/DL
PLATELET # BLD AUTO: 233 K/UL
POTASSIUM SERPL-SCNC: 4.6 MMOL/L
PROT SERPL-MCNC: 6.1 G/DL
PSA FREE FLD-MCNC: 28 %
PSA FREE SERPL-MCNC: 0.81 NG/ML
PSA SERPL-MCNC: 2.9 NG/ML
RBC # BLD: 4.56 M/UL
RBC # FLD: 12.3 %
SODIUM SERPL-SCNC: 140 MMOL/L
TRIGL SERPL-MCNC: 81 MG/DL
TSH SERPL-ACNC: 1.71 UIU/ML
VIT B12 SERPL-MCNC: 587 PG/ML
WBC # FLD AUTO: 3.61 K/UL

## 2021-11-18 PROCEDURE — 90662 IIV NO PRSV INCREASED AG IM: CPT

## 2021-11-18 PROCEDURE — G0008: CPT

## 2021-11-18 PROCEDURE — G0439: CPT

## 2021-11-19 ENCOUNTER — APPOINTMENT (OUTPATIENT)
Dept: ULTRASOUND IMAGING | Facility: CLINIC | Age: 71
End: 2021-11-19
Payer: MEDICARE

## 2021-11-19 ENCOUNTER — OUTPATIENT (OUTPATIENT)
Dept: OUTPATIENT SERVICES | Facility: HOSPITAL | Age: 71
LOS: 1 days | End: 2021-11-19
Payer: MEDICARE

## 2021-11-19 DIAGNOSIS — Z95.5 PRESENCE OF CORONARY ANGIOPLASTY IMPLANT AND GRAFT: Chronic | ICD-10-CM

## 2021-11-19 DIAGNOSIS — M79.662 PAIN IN LEFT LOWER LEG: ICD-10-CM

## 2021-11-19 LAB
CREAT SPEC-SCNC: 78 MG/DL
MICROALBUMIN 24H UR DL<=1MG/L-MCNC: <1.2 MG/DL
MICROALBUMIN/CREAT 24H UR-RTO: NORMAL MG/G

## 2021-11-19 PROCEDURE — 93971 EXTREMITY STUDY: CPT | Mod: 26

## 2021-11-19 PROCEDURE — 93971 EXTREMITY STUDY: CPT

## 2021-11-21 PROBLEM — R73.09 ABNORMAL BLOOD SUGAR: Status: RESOLVED | Noted: 2019-03-22 | Resolved: 2021-11-21

## 2021-11-21 PROBLEM — M47.814 THORACIC SPONDYLOSIS: Status: RESOLVED | Noted: 2018-10-18 | Resolved: 2021-11-21

## 2021-11-21 PROBLEM — Z87.39 HISTORY OF LOW BACK PAIN: Status: RESOLVED | Noted: 2018-10-18 | Resolved: 2021-11-21

## 2021-11-21 PROBLEM — R94.5 ABNORMAL LFTS: Status: RESOLVED | Noted: 2019-03-22 | Resolved: 2021-11-21

## 2021-11-21 PROBLEM — Z00.00 ENCOUNTER FOR PREVENTIVE HEALTH EXAMINATION: Status: ACTIVE | Noted: 2018-02-26

## 2021-11-21 PROBLEM — M79.662 PAIN OF LEFT CALF: Status: ACTIVE | Noted: 2021-11-18

## 2021-11-21 PROBLEM — R21 RASH OF BODY: Status: RESOLVED | Noted: 2018-10-11 | Resolved: 2021-11-21

## 2021-11-21 PROBLEM — H92.02 LEFT EAR PAIN: Status: RESOLVED | Noted: 2019-03-22 | Resolved: 2021-11-21

## 2021-11-21 PROBLEM — E11.40 DIABETIC NEUROPATHY: Status: ACTIVE | Noted: 2018-05-18

## 2021-11-21 PROBLEM — M25.551 ACUTE RIGHT HIP PAIN: Status: RESOLVED | Noted: 2018-10-11 | Resolved: 2021-11-21

## 2021-11-21 PROBLEM — R21 RASH OF FACE: Status: RESOLVED | Noted: 2019-07-16 | Resolved: 2021-11-21

## 2021-11-21 PROBLEM — M25.559 HIP PAIN: Status: RESOLVED | Noted: 2018-10-05 | Resolved: 2021-11-21

## 2021-11-21 PROBLEM — L70.0 OPEN COMEDONE: Status: RESOLVED | Noted: 2018-04-06 | Resolved: 2021-11-21

## 2021-11-21 PROBLEM — Z92.29 HISTORY OF PNEUMOCOCCAL VACCINATION: Status: RESOLVED | Noted: 2018-09-10 | Resolved: 2021-11-21

## 2021-11-21 PROBLEM — M25.562 PAIN IN JOINT INVOLVING LEFT LOWER LEG: Status: RESOLVED | Noted: 2018-06-01 | Resolved: 2021-11-21

## 2021-11-21 PROBLEM — Z87.2 HISTORY OF ALLERGIC CONTACT DERMATITIS: Status: RESOLVED | Noted: 2018-09-17 | Resolved: 2021-11-21

## 2021-11-21 PROBLEM — Z09 HOSPITAL DISCHARGE FOLLOW-UP: Status: RESOLVED | Noted: 2018-06-01 | Resolved: 2021-11-21

## 2021-11-21 PROBLEM — Z92.29 HISTORY OF INFLUENZA VACCINATION: Status: RESOLVED | Noted: 2018-11-02 | Resolved: 2021-11-21

## 2021-11-21 PROBLEM — I73.9 PAD (PERIPHERAL ARTERY DISEASE): Status: ACTIVE | Noted: 2021-11-18

## 2021-11-21 PROBLEM — H81.13 BPV (BENIGN POSITIONAL VERTIGO), BILATERAL: Status: RESOLVED | Noted: 2018-11-02 | Resolved: 2021-11-21

## 2021-11-21 PROBLEM — R74.8 ABNORMAL LIVER ENZYMES: Status: RESOLVED | Noted: 2019-03-22 | Resolved: 2021-11-21

## 2021-11-21 PROBLEM — H93.12 TINNITUS OF LEFT EAR: Status: RESOLVED | Noted: 2018-03-20 | Resolved: 2021-11-21

## 2021-11-22 ENCOUNTER — NON-APPOINTMENT (OUTPATIENT)
Age: 71
End: 2021-11-22

## 2021-11-22 RX ORDER — GABAPENTIN 100 MG/1
100 CAPSULE ORAL
Qty: 30 | Refills: 1 | Status: ACTIVE | COMMUNITY
Start: 2021-11-22 | End: 1900-01-01

## 2021-11-23 RX ORDER — HYDROCORTISONE 25 MG/G
2.5 CREAM TOPICAL TWICE DAILY
Qty: 1 | Refills: 0 | Status: DISCONTINUED | COMMUNITY
Start: 2018-10-11 | End: 2021-11-23

## 2021-11-23 RX ORDER — TRAMADOL HYDROCHLORIDE 50 MG/1
50 TABLET, COATED ORAL 3 TIMES DAILY
Qty: 90 | Refills: 0 | Status: DISCONTINUED | COMMUNITY
Start: 2018-10-18 | End: 2021-11-23

## 2021-11-23 RX ORDER — METFORMIN HYDROCHLORIDE 500 MG/1
500 TABLET, FILM COATED, EXTENDED RELEASE ORAL
Qty: 360 | Refills: 1 | Status: DISCONTINUED | COMMUNITY
Start: 2018-03-13 | End: 2021-11-23

## 2021-11-23 RX ORDER — METOPROLOL SUCCINATE 25 MG/1
25 TABLET, EXTENDED RELEASE ORAL
Qty: 90 | Refills: 1 | Status: DISCONTINUED | COMMUNITY
Start: 1900-01-01 | End: 2021-11-23

## 2021-11-23 RX ORDER — METFORMIN ER 500 MG 500 MG/1
500 TABLET ORAL
Qty: 360 | Refills: 1 | Status: DISCONTINUED | COMMUNITY
Start: 2018-12-05 | End: 2021-11-23

## 2021-11-23 RX ORDER — ISOSORBIDE MONONITRATE 30 MG/1
30 TABLET, EXTENDED RELEASE ORAL DAILY
Qty: 30 | Refills: 0 | Status: DISCONTINUED | COMMUNITY
Start: 2018-03-13 | End: 2021-11-23

## 2021-11-23 RX ORDER — REPAGLINIDE 1 MG/1
1 TABLET ORAL TWICE DAILY
Qty: 60 | Refills: 0 | Status: DISCONTINUED | COMMUNITY
Start: 2018-03-13 | End: 2021-11-23

## 2021-11-23 RX ORDER — CLOBETASOL PROPIONATE 0.5 MG/G
0.05 OINTMENT TOPICAL TWICE DAILY
Qty: 1 | Refills: 1 | Status: DISCONTINUED | COMMUNITY
Start: 2018-09-19 | End: 2021-11-23

## 2021-11-23 RX ORDER — PEN NEEDLE, DIABETIC 29 G X1/2"
31G X 5 MM NEEDLE, DISPOSABLE MISCELLANEOUS
Qty: 1 | Refills: 0 | Status: DISCONTINUED | COMMUNITY
Start: 2018-03-15 | End: 2021-11-23

## 2021-11-23 RX ORDER — TRAMADOL HYDROCHLORIDE 50 MG/1
50 TABLET, COATED ORAL 3 TIMES DAILY
Qty: 15 | Refills: 0 | Status: DISCONTINUED | COMMUNITY
Start: 2018-10-11 | End: 2021-11-23

## 2021-11-23 NOTE — PHYSICAL EXAM
[No Acute Distress] : no acute distress [Well Nourished] : well nourished [Well Developed] : well developed [Well-Appearing] : well-appearing [Normal Sclera/Conjunctiva] : normal sclera/conjunctiva [PERRL] : pupils equal round and reactive to light [EOMI] : extraocular movements intact [Normal Outer Ear/Nose] : the outer ears and nose were normal in appearance [Normal Oropharynx] : the oropharynx was normal [No JVD] : no jugular venous distention [No Lymphadenopathy] : no lymphadenopathy [Supple] : supple [Thyroid Normal, No Nodules] : the thyroid was normal and there were no nodules present [No Respiratory Distress] : no respiratory distress  [No Accessory Muscle Use] : no accessory muscle use [Clear to Auscultation] : lungs were clear to auscultation bilaterally [Normal Rate] : normal rate  [Regular Rhythm] : with a regular rhythm [Normal S1, S2] : normal S1 and S2 [No Murmur] : no murmur heard [No Carotid Bruits] : no carotid bruits [No Abdominal Bruit] : a ~M bruit was not heard ~T in the abdomen [No Varicosities] : no varicosities [No Edema] : there was no peripheral edema [No Palpable Aorta] : no palpable aorta [No Extremity Clubbing/Cyanosis] : no extremity clubbing/cyanosis [Soft] : abdomen soft [Non Tender] : non-tender [Non-distended] : non-distended [No Masses] : no abdominal mass palpated [No HSM] : no HSM [Normal Bowel Sounds] : normal bowel sounds [Normal Posterior Cervical Nodes] : no posterior cervical lymphadenopathy [Normal Anterior Cervical Nodes] : no anterior cervical lymphadenopathy [No CVA Tenderness] : no CVA  tenderness [No Spinal Tenderness] : no spinal tenderness [No Joint Swelling] : no joint swelling [Grossly Normal Strength/Tone] : grossly normal strength/tone [No Rash] : no rash [Coordination Grossly Intact] : coordination grossly intact [No Focal Deficits] : no focal deficits [Normal Gait] : normal gait [Deep Tendon Reflexes (DTR)] : deep tendon reflexes were 2+ and symmetric [Normal Affect] : the affect was normal [Normal Insight/Judgement] : insight and judgment were intact [Comprehensive Foot Exam Normal] : Right and left foot were examined and both feet are normal. No ulcers in either foot. Toes are normal and with full ROM.  Normal tactile sensation with monofilament testing throughout both feet [de-identified] : pedal pulse on left +1, neg emanuel's sign on the left

## 2021-11-23 NOTE — HISTORY OF PRESENT ILLNESS
[FreeTextEntry1] : CPE [de-identified] : 70 yo m, pmxh of DM (on lantus and lispro), HTN, HLD, CAD (s/p 2 stents in 2018; on ASA and plavix), for CPE\par [facility] from [place] \par saw p[facility] 4 months ago \par taking lantus 40 Units in pm\par at 3 am occ notes sugar is low and feels light headeded during the day time\par mealtimes his FS is usually around 200 \par c/o b/l leg pain\par pain has been getting worse over the past few months\par pain worse in right leg- feels like something is constantly crawling on legs\par left calf has been painful for the past week\par denies any swelling or redness\par denies any chest pain, sob, or palpitations\par denies any other complaints or concerns at this time

## 2021-11-23 NOTE — ASSESSMENT
[FreeTextEntry1] : Routine medical examination\par VSS- exam normal \par c/w current medications\par Advised healthy diet and exercise\par will follow up imaging\par referred to vascular and cardio\par follow up in 4 weeks \par patient verbalizes understanding and patient is stable upon discharge\par

## 2021-11-23 NOTE — HEALTH RISK ASSESSMENT
[Very Good] : ~his/her~  mood as very good [No] : In the past 12 months have you used drugs other than those required for medical reasons? No [No falls in past year] : Patient reported no falls in the past year [0] : 2) Feeling down, depressed, or hopeless: Not at all (0) [PHQ-2 Negative - No further assessment needed] : PHQ-2 Negative - No further assessment needed [HIV test declined] : HIV test declined [Hepatitis C test declined] : Hepatitis C test declined [None] : None [Alone] : lives alone [Retired] : retired [Feels Safe at Home] : Feels safe at home [Fully functional (bathing, dressing, toileting, transferring, walking, feeding)] : Fully functional (bathing, dressing, toileting, transferring, walking, feeding) [Fully functional (using the telephone, shopping, preparing meals, housekeeping, doing laundry, using] : Fully functional and needs no help or supervision to perform IADLs (using the telephone, shopping, preparing meals, housekeeping, doing laundry, using transportation, managing medications and managing finances) [] : No [de-identified] : none [Audit-CScore] : 0 [de-identified] : walking [de-identified] : balanced [[id number]] : 0 [Change in mental status noted] : No change in mental status noted [Language] : denies difficulty with language [Sexually Active] : not sexually active [Reports changes in hearing] : Reports no changes in hearing [Reports changes in vision] : Reports no changes in vision [ColonoscopyComments] : scheduled; consultation done last month [FreeTextEntry2] : construction

## 2021-11-24 ENCOUNTER — NON-APPOINTMENT (OUTPATIENT)
Age: 71
End: 2021-11-24

## 2021-11-24 ENCOUNTER — APPOINTMENT (OUTPATIENT)
Dept: OPHTHALMOLOGY | Facility: CLINIC | Age: 71
End: 2021-11-24
Payer: MEDICARE

## 2021-11-24 PROCEDURE — 92250 FUNDUS PHOTOGRAPHY W/I&R: CPT

## 2021-11-24 PROCEDURE — 92004 COMPRE OPH EXAM NEW PT 1/>: CPT

## 2021-11-29 ENCOUNTER — APPOINTMENT (OUTPATIENT)
Dept: VASCULAR SURGERY | Facility: CLINIC | Age: 71
End: 2021-11-29
Payer: MEDICARE

## 2021-11-29 VITALS
HEIGHT: 72 IN | HEART RATE: 83 BPM | DIASTOLIC BLOOD PRESSURE: 76 MMHG | WEIGHT: 198 LBS | TEMPERATURE: 97.7 F | BODY MASS INDEX: 26.82 KG/M2 | SYSTOLIC BLOOD PRESSURE: 128 MMHG

## 2021-11-29 PROCEDURE — 99203 OFFICE O/P NEW LOW 30 MIN: CPT

## 2021-11-29 PROCEDURE — 93923 UPR/LXTR ART STDY 3+ LVLS: CPT

## 2021-11-29 PROCEDURE — 93970 EXTREMITY STUDY: CPT

## 2021-12-01 ENCOUNTER — APPOINTMENT (OUTPATIENT)
Dept: ORTHOPEDIC SURGERY | Facility: CLINIC | Age: 71
End: 2021-12-01
Payer: MEDICARE

## 2021-12-01 VITALS — BODY MASS INDEX: 26.82 KG/M2 | WEIGHT: 198 LBS | HEIGHT: 72 IN

## 2021-12-01 DIAGNOSIS — M47.817 SPONDYLOSIS W/OUT MYELOPATHY OR RADICULOPATHY, LUMBOSACRAL REGION: ICD-10-CM

## 2021-12-01 DIAGNOSIS — M54.16 RADICULOPATHY, LUMBAR REGION: ICD-10-CM

## 2021-12-01 DIAGNOSIS — M47.812 SPONDYLOSIS W/OUT MYELOPATHY OR RADICULOPATHY, CERVICAL REGION: ICD-10-CM

## 2021-12-01 DIAGNOSIS — M54.12 RADICULOPATHY, CERVICAL REGION: ICD-10-CM

## 2021-12-01 PROCEDURE — 72082 X-RAY EXAM ENTIRE SPI 2/3 VW: CPT

## 2021-12-01 PROCEDURE — 99203 OFFICE O/P NEW LOW 30 MIN: CPT

## 2021-12-01 NOTE — HISTORY OF PRESENT ILLNESS
[de-identified] : Mr. GLORIA BIRCH  is a 71 year old male who presents with 6 months of neck/head pain and left arm and left leg pain and 2 months of right leg pain.  Denies any LE radicular symptoms.  Normal bowel and bladder control.   Denies any recent fevers, chills, sweats, weight loss, or infection.  His pain is worse when he lays down. \par \par The patients past medical history, past surgical history, medications, allergies, and social history were reviewed by me today with the patient and documented accordingly.  In addition, the patient's family history, which is noncontributory to their visit, was also reviewed.\par

## 2021-12-01 NOTE — PHYSICAL EXAM
[Antalgic] : not antalgic [Ataxic] : not ataxic [de-identified] : Examination of the lumbar spine reveals no midline tenderness palpation, step-offs, or skin lesions. Decreased range of motion with respect to flexion, extension, lateral bending, and rotation. No tenderness to palpation of the sciatic notch. No tenderness palpation of the bilateral greater trochanters. No pain with passive internal/external rotation of the hips. No instability of bilateral lower extremities.  Negative YOLANDA. Negative straight leg raise bilaterally. No bowstring. Negative femoral stretch. 5 out of 5 iliopsoas, hip abductors, hips adductors, quadriceps, hamstrings, gastrocsoleus, tibialis anterior, extensor hallucis longus, peroneals. Grossly intact sensation to light touch bilateral lower extremities. 1+ patellar and Achilles reflexes. Downgoing Babinski. No clonus. Intact proprioception. Palpable pulses. No skin lesion and no edema on the right and left lower extremities.\par \par Examination of the cervical spine reveals no midline or paraspinal tenderness to palpation. No cervical lymphadenopathy. Decreased range of motion with respect to flexion, extension, rotation, and lateral bending. Negative Spurlings. Negative Lhermitte's. Full range of motion bilateral shoulders without evidence of impingement. No instability of bilateral upper extremities.  Cranial nerves II through XII grossly intact. Intact sensation bilateral upper extremities. 5/5 deltoids biceps triceps wrist extensors wrist flexors finger flexors and hand intrinsics. 1+ biceps triceps and brachioradialis reflexes. Negative Burk's. 2+ radial pulse. Negative Tinel's over the cubital and carpal tunnel. No skin lesions on the right and left upper extremities. [de-identified] : AP lateral scoliosis x-rays reveals multilevel spondylosis without instability or aggressive lesions

## 2021-12-01 NOTE — DISCUSSION/SUMMARY
[de-identified] : We reviewed his imaging and discuss further treatment options.  He will try course of physical therapy.  MRI if not improved or worsened.

## 2021-12-03 NOTE — HISTORY OF PRESENT ILLNESS
[FreeTextEntry1] : I have just had the pleasure of seeing Mr. Hernandez in consultation for bilateral lower extremity pain. He is a pleasant 71 year old gentleman who reports shooting pain from the right hip distally down the leg. He reports this pain occurs at rest and he has difficulty sleeping at night due to inability to position himself comfortably. Additionally he complains of left calf claudication symptoms and paresthesia in the feet. He denies any history of DVT or SVT. He reports bilateral leg spider and varicose veins. He does not presently wear compression stockings.\par \par PMH: CAD, PCI 3 years ago at [facility], DMII, neuropathy, HLD, BPV, and DJD\par \par Medications: Aspirin, Plavix, Lipitor, Insulin, Neurontin, Lisinopril and Metoprolol\par \par All: Ibuprofen\par \par FH: NC\par \par SH: non-smoker. works as [employer]/

## 2021-12-03 NOTE — PHYSICAL EXAM
[Normal Breath Sounds] : Normal breath sounds [Normal Heart Sounds] : normal heart sounds [2+] : right 2+ [0] : left 0 [de-identified] : NAD. Neurologically intact. [de-identified] : WNL [FreeTextEntry1] : A venous duplex showed:\par -No evidence of DVT in either extremity\par -Chronic changes in bilateral SSV\par -Reflux in bilateral GSV\par \par ZOHREH/PVR were:\par -Right: NC; toe pressure 90, TBI 0.68; blunting of waveforms at metatarsal level\par -Left: toe pressure 57, TBI 0.43; blunting of waveforms at ankle level and distally

## 2021-12-03 NOTE — ASSESSMENT
[FreeTextEntry1] : In summary, Mr. Hernandez presents with bilateral lower extremity superficial venous insufficiency for which I provided him an rx for knee high 20-30mmHg compression stockings, which he should wear daily and remove at night. 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 He should begin an exercise regimen with 30 minutes of aerobic activity daily. His right lower extremity pain is consistent with sciatica and I have referred him to a spine specialist for further evaluation. He will return to clinic in 3 months with repeat ZOHREH and a carotid duplex (prior history of bruit).

## 2021-12-08 ENCOUNTER — APPOINTMENT (OUTPATIENT)
Dept: GASTROENTEROLOGY | Facility: CLINIC | Age: 71
End: 2021-12-08
Payer: MEDICARE

## 2021-12-08 VITALS
OXYGEN SATURATION: 96 % | SYSTOLIC BLOOD PRESSURE: 132 MMHG | BODY MASS INDEX: 27.63 KG/M2 | HEART RATE: 76 BPM | DIASTOLIC BLOOD PRESSURE: 86 MMHG | WEIGHT: 204 LBS | HEIGHT: 72 IN | TEMPERATURE: 98.6 F

## 2021-12-08 PROCEDURE — 99203 OFFICE O/P NEW LOW 30 MIN: CPT

## 2021-12-08 RX ORDER — PEG-3350, SODIUM SULFATE, SODIUM CHLORIDE, POTASSIUM CHLORIDE, SODIUM ASCORBATE AND ASCORBIC ACID 7.5-2.691G
100 KIT ORAL
Qty: 1 | Refills: 0 | Status: ACTIVE | COMMUNITY
Start: 2021-12-08 | End: 1900-01-01

## 2021-12-08 NOTE — PHYSICAL EXAM
[General Appearance - Alert] : alert [General Appearance - In No Acute Distress] : in no acute distress [Sclera] : the sclera and conjunctiva were normal [Extraocular Movements] : extraocular movements were intact [Outer Ear] : the ears and nose were normal in appearance [Examination Of The Oral Cavity] : the lips and gums were normal [Neck Appearance] : the appearance of the neck was normal [Neck Cervical Mass (___cm)] : no neck mass was observed [Jugular Venous Distention Increased] : there was no jugular-venous distention [Thyroid Diffuse Enlargement] : the thyroid was not enlarged [Thyroid Nodule] : there were no palpable thyroid nodules [Auscultation Breath Sounds / Voice Sounds] : lungs were clear to auscultation bilaterally [Heart Rate And Rhythm] : heart rate was normal and rhythm regular [Heart Sounds] : normal S1 and S2 [Heart Sounds Gallop] : no gallops [Murmurs] : no murmurs [Heart Sounds Pericardial Friction Rub] : no pericardial rub [Bowel Sounds] : normal bowel sounds [Abdomen Soft] : soft [Abdomen Tenderness] : non-tender [] : no hepato-splenomegaly [Abdomen Mass (___ Cm)] : no abdominal mass palpated [Occult Blood Positive] : stool was negative for occult blood [Cervical Lymph Nodes Enlarged Posterior Bilaterally] : posterior cervical [Cervical Lymph Nodes Enlarged Anterior Bilaterally] : anterior cervical [Supraclavicular Lymph Nodes Enlarged Bilaterally] : supraclavicular [Axillary Lymph Nodes Enlarged Bilaterally] : axillary [Femoral Lymph Nodes Enlarged Bilaterally] : femoral [Inguinal Lymph Nodes Enlarged Bilaterally] : inguinal [No Spinal Tenderness] : no spinal tenderness [Motor Exam] : the motor exam was normal [No Focal Deficits] : no focal deficits [Oriented To Time, Place, And Person] : oriented to person, place, and time

## 2021-12-08 NOTE — HISTORY OF PRESENT ILLNESS
[FreeTextEntry1] : GLORIA BIRCH 71 year [facility] male .He presents for initial evaluation and consultation for Colorectal cancer screening.Denies any constipation,diarrhea, BPR,melena or unintentional weight loss.Reports having good appetite.\par No history of colorectal or any GI cancer in the  close family members.\par No history of any cardiac or pulmonary disease.Never had any colonoscopy in the past.\par Hx of PCI at [facility] for CAD 3 yrs ago, no CP or SOB since then\par No ETOH or smoking

## 2022-01-10 RX ORDER — PEN NEEDLE, DIABETIC 29 G X1/2"
32G X 4 MM NEEDLE, DISPOSABLE MISCELLANEOUS
Qty: 1 | Refills: 4 | Status: ACTIVE | COMMUNITY
Start: 2018-09-04 | End: 1900-01-01

## 2022-01-17 RX ORDER — BLOOD-GLUCOSE METER
EACH MISCELLANEOUS DAILY
Qty: 1 | Refills: 11 | Status: ACTIVE | COMMUNITY
Start: 2019-02-25 | End: 1900-01-01

## 2022-01-18 RX ORDER — BLOOD SUGAR DIAGNOSTIC
STRIP MISCELLANEOUS
Qty: 100 | Refills: 5 | Status: ACTIVE | COMMUNITY
Start: 2022-01-18 | End: 1900-01-01

## 2022-01-20 ENCOUNTER — APPOINTMENT (OUTPATIENT)
Dept: FAMILY MEDICINE | Facility: CLINIC | Age: 72
End: 2022-01-20
Payer: MEDICARE

## 2022-01-20 VITALS
OXYGEN SATURATION: 97 % | BODY MASS INDEX: 27.4 KG/M2 | WEIGHT: 202 LBS | TEMPERATURE: 97.5 F | HEART RATE: 63 BPM | SYSTOLIC BLOOD PRESSURE: 124 MMHG | RESPIRATION RATE: 14 BRPM | DIASTOLIC BLOOD PRESSURE: 64 MMHG

## 2022-01-20 DIAGNOSIS — Z87.898 PERSONAL HISTORY OF OTHER SPECIFIED CONDITIONS: ICD-10-CM

## 2022-01-20 DIAGNOSIS — E78.5 HYPERLIPIDEMIA, UNSPECIFIED: ICD-10-CM

## 2022-01-20 DIAGNOSIS — R10.32 LEFT LOWER QUADRANT PAIN: ICD-10-CM

## 2022-01-20 DIAGNOSIS — R10.9 UNSPECIFIED ABDOMINAL PAIN: ICD-10-CM

## 2022-01-20 LAB
BILIRUB UR QL STRIP: NEGATIVE
CLARITY UR: CLEAR
COLLECTION METHOD: NORMAL
GLUCOSE UR-MCNC: NORMAL
HCG UR QL: 3.2 EU/DL
HGB UR QL STRIP.AUTO: NEGATIVE
KETONES UR-MCNC: NEGATIVE
LEUKOCYTE ESTERASE UR QL STRIP: NEGATIVE
NITRITE UR QL STRIP: NEGATIVE
PH UR STRIP: 6
PROT UR STRIP-MCNC: NEGATIVE
SP GR UR STRIP: 1.02

## 2022-01-20 PROCEDURE — 99214 OFFICE O/P EST MOD 30 MIN: CPT | Mod: 25

## 2022-01-20 PROCEDURE — 81003 URINALYSIS AUTO W/O SCOPE: CPT | Mod: QW

## 2022-01-20 RX ORDER — PANTOPRAZOLE 40 MG/1
40 TABLET, DELAYED RELEASE ORAL
Qty: 14 | Refills: 1 | Status: ACTIVE | COMMUNITY
Start: 2022-01-20 | End: 1900-01-01

## 2022-01-21 RX ORDER — BLOOD-GLUCOSE METER
W/DEVICE EACH MISCELLANEOUS
Qty: 1 | Refills: 0 | Status: ACTIVE | COMMUNITY
Start: 2022-01-21 | End: 1900-01-01

## 2022-01-24 PROBLEM — Z87.898 HISTORY OF ABDOMINAL PAIN: Status: RESOLVED | Noted: 2022-01-20 | Resolved: 2022-01-20

## 2022-01-24 NOTE — ASSESSMENT
[FreeTextEntry1] : VSS, exam normal\par medication as prescribed, medication education done \par advised to increase fluid intake, rest, and acetaminophen/ibuprofen prn pain/fever\par will follow up labs\par will reach out to GI to schedule patient for screening colonoscopy\par follow up in 1 week\par follow up in office sooner if symptoms persists or worsens\par patient verbalizes understanding and is stable upon d/c\par

## 2022-01-24 NOTE — HISTORY OF PRESENT ILLNESS
[FreeTextEntry8] : 70 yo m, with abdominal pain x 1 month\par abdominal pain x 1 month \par pain is on an off all day \par denies any triggers of sx \par denies that symptoms are not associated with after eating\par denies any diarrhea or constipation\par denies any urinary symptoms  \par denies any blood in the stool\par appetite normal \par feels consistent throughout the day \par denies any other associated symptoms \par \par - for follow up of DM \par taking 35 lantus \par humlog 8 units tid \par needs rx for new glucometer and test strips \par denies any other complaints or concerns at this time

## 2022-01-24 NOTE — PHYSICAL EXAM
[Normal] : no CVA or spinal tenderness [de-identified] : no rebound or guarding, neg carbone's sign,neg mcburney's point

## 2022-01-28 ENCOUNTER — APPOINTMENT (OUTPATIENT)
Dept: CARDIOLOGY | Facility: CLINIC | Age: 72
End: 2022-01-28
Payer: MEDICARE

## 2022-01-28 ENCOUNTER — NON-APPOINTMENT (OUTPATIENT)
Age: 72
End: 2022-01-28

## 2022-01-28 VITALS
HEIGHT: 72 IN | SYSTOLIC BLOOD PRESSURE: 130 MMHG | HEART RATE: 70 BPM | OXYGEN SATURATION: 96 % | BODY MASS INDEX: 26.95 KG/M2 | DIASTOLIC BLOOD PRESSURE: 70 MMHG | WEIGHT: 199 LBS

## 2022-01-28 DIAGNOSIS — M47.816 SPONDYLOSIS W/OUT MYELOPATHY OR RADICULOPATHY, LUMBAR REGION: ICD-10-CM

## 2022-01-28 PROCEDURE — 99204 OFFICE O/P NEW MOD 45 MIN: CPT

## 2022-01-28 PROCEDURE — 93000 ELECTROCARDIOGRAM COMPLETE: CPT

## 2022-01-28 NOTE — PHYSICAL EXAM
[Well Developed] : well developed [Well Nourished] : well nourished [No Acute Distress] : no acute distress [Normal Conjunctiva] : normal conjunctiva [Normal Venous Pressure] : normal venous pressure [No Carotid Bruit] : no carotid bruit [Normal S1, S2] : normal S1, S2 [No Murmur] : no murmur [No Rub] : no rub [No Gallop] : no gallop [Clear Lung Fields] : clear lung fields [Good Air Entry] : good air entry [No Respiratory Distress] : no respiratory distress  [Soft] : abdomen soft [Non Tender] : non-tender [No Masses/organomegaly] : no masses/organomegaly [Normal Bowel Sounds] : normal bowel sounds [Normal Gait] : normal gait [No Edema] : no edema [No Cyanosis] : no cyanosis [No Clubbing] : no clubbing [No Varicosities] : no varicosities [Normal Radial B/L] : normal radial B/L [No Rash] : no rash [No Skin Lesions] : no skin lesions [Moves all extremities] : moves all extremities [No Focal Deficits] : no focal deficits [Normal Speech] : normal speech [Alert and Oriented] : alert and oriented [Normal memory] : normal memory [de-identified] : 1+ pedal pulses on the left and difficult to palpate pedal pulses on the right

## 2022-01-28 NOTE — REVIEW OF SYSTEMS
[Joint Pain] : joint pain [Tingling (Paresthesia)] : tingling [Negative] : Heme/Lymph [FreeTextEntry9] : see HPI [de-identified] : Radicular pain left side

## 2022-01-28 NOTE — HISTORY OF PRESENT ILLNESS
[FreeTextEntry1] : 1/28/2022.  Long complicated history.  He Is originally from [place] [facility].  He has a niece who lives in [facility] and a daughter who is a family practice physician and was living in [facility].  In 2018 he went to visit her and while there had episodes of his heart "vibrating" with 2 brief syncopal or near syncopal episodes.  He did not think the physician in [place] was taking him seriously and he decided to drive to [facility] to see his niece.  It was a 2-week drive and he had frequent episodes and feelings of his heart "vibrating".  At one point he said he was then found to have blockages and had stents and then later he said he actually was going to have a colonoscopy and when he told the doctor there his story they sent him to the emergency room.  In either case he ended up with a stent and then 6 weeks later had a second stent but there are no records and he has no idea which arteries.  This was done at [facility].  Since then he no longer has any heart vibrating episodes and has been fairly healthy.  He is active, walks 4 miles a day and his main complaint is pain down his left side of his face left shoulder left arm etc.  Along the way he has seen a vascular the above documentation completed by the scribe is an accurate record of both my words and actions.  We found an abnormal ZOHREH on the left but not on the right and some mild venous insufficiency.  He was also sent to a spine surgeon disc disease was in fact found.  He has had no other hospitalizations, no surgeries.  He is diabetic for 11 years as was his paternal grandmother.  There is no family history of coronary disease.  He never smoked was never told of hypertension or hyperlipidemia.  He does not drink alcohol.  He has a twin sister in [facility] who has diabetes but no coronary disease.  He is on a high-dose statin, lisinopril, metoprolol and still remains on dual antiplatelet therapy which includes Plavix and 2 baby aspirin which I believe he does on his own.  He claims he gets some heartburn and trouble the pill going down when he takes the Plavix.

## 2022-02-02 RX ORDER — INSULIN GLARGINE 100 [IU]/ML
100 INJECTION, SOLUTION SUBCUTANEOUS
Qty: 45 | Refills: 5 | Status: ACTIVE | COMMUNITY
Start: 2018-03-15 | End: 1900-01-01

## 2022-02-07 RX ORDER — INSULIN LISPRO 100 [IU]/ML
100 INJECTION, SOLUTION INTRAVENOUS; SUBCUTANEOUS
Qty: 45 | Refills: 5 | Status: ACTIVE | COMMUNITY
Start: 2018-03-23 | End: 1900-01-01

## 2022-02-08 LAB
ALBUMIN SERPL ELPH-MCNC: 4.6 G/DL
ALP BLD-CCNC: 67 U/L
ALT SERPL-CCNC: 40 U/L
AMYLASE/CREAT SERPL: 42 U/L
ANION GAP SERPL CALC-SCNC: 14 MMOL/L
APPEARANCE: CLEAR
AST SERPL-CCNC: 21 U/L
BACTERIA UR CULT: NORMAL
BACTERIA: NEGATIVE
BASOPHILS # BLD AUTO: 0.05 K/UL
BASOPHILS NFR BLD AUTO: 1.4 %
BILIRUB DIRECT SERPL-MCNC: 0.1 MG/DL
BILIRUB INDIRECT SERPL-MCNC: 0.4 MG/DL
BILIRUB SERPL-MCNC: 0.5 MG/DL
BILIRUBIN URINE: NEGATIVE
BLOOD URINE: NEGATIVE
BUN SERPL-MCNC: 24 MG/DL
CALCIUM SERPL-MCNC: 9.5 MG/DL
CHLORIDE SERPL-SCNC: 107 MMOL/L
CO2 SERPL-SCNC: 21 MMOL/L
COLOR: NORMAL
CREAT SERPL-MCNC: 1.11 MG/DL
EOSINOPHIL # BLD AUTO: 0.14 K/UL
EOSINOPHIL NFR BLD AUTO: 3.8 %
ERYTHROCYTE [SEDIMENTATION RATE] IN BLOOD BY WESTERGREN METHOD: 8 MM/HR
ESTIMATED AVERAGE GLUCOSE: 217 MG/DL
GGT SERPL-CCNC: 24 U/L
GLUCOSE QUALITATIVE U: NEGATIVE
GLUCOSE SERPL-MCNC: 93 MG/DL
HBA1C MFR BLD HPLC: 9.2 %
HCT VFR BLD CALC: 44.3 %
HGB BLD-MCNC: 14.2 G/DL
HYALINE CASTS: 0 /LPF
IMM GRANULOCYTES NFR BLD AUTO: 0.3 %
KETONES URINE: NEGATIVE
LEUKOCYTE ESTERASE URINE: NEGATIVE
LPL SERPL-CCNC: 12 U/L
LYMPHOCYTES # BLD AUTO: 1.22 K/UL
LYMPHOCYTES NFR BLD AUTO: 33.5 %
MAN DIFF?: NORMAL
MCHC RBC-ENTMCNC: 29.6 PG
MCHC RBC-ENTMCNC: 32.1 GM/DL
MCV RBC AUTO: 92.5 FL
MICROSCOPIC-UA: NORMAL
MONOCYTES # BLD AUTO: 0.31 K/UL
MONOCYTES NFR BLD AUTO: 8.5 %
NEUTROPHILS # BLD AUTO: 1.91 K/UL
NEUTROPHILS NFR BLD AUTO: 52.5 %
NITRITE URINE: NEGATIVE
PH URINE: 6.5
PLATELET # BLD AUTO: 252 K/UL
POTASSIUM SERPL-SCNC: 4.9 MMOL/L
PROT SERPL-MCNC: 6.8 G/DL
PROTEIN URINE: NEGATIVE
RBC # BLD: 4.79 M/UL
RBC # FLD: 12.4 %
RED BLOOD CELLS URINE: 1 /HPF
SODIUM SERPL-SCNC: 143 MMOL/L
SPECIFIC GRAVITY URINE: 1.02
SQUAMOUS EPITHELIAL CELLS: 0 /HPF
UROBILINOGEN URINE: NORMAL
WBC # FLD AUTO: 3.64 K/UL
WHITE BLOOD CELLS URINE: 0 /HPF

## 2022-03-03 ENCOUNTER — APPOINTMENT (OUTPATIENT)
Dept: CARDIOLOGY | Facility: CLINIC | Age: 72
End: 2022-03-03

## 2022-03-07 ENCOUNTER — APPOINTMENT (OUTPATIENT)
Dept: VASCULAR SURGERY | Facility: CLINIC | Age: 72
End: 2022-03-07

## 2022-03-29 ENCOUNTER — APPOINTMENT (OUTPATIENT)
Dept: CARDIOLOGY | Facility: CLINIC | Age: 72
End: 2022-03-29
Payer: MEDICARE

## 2022-03-29 VITALS
BODY MASS INDEX: 27.09 KG/M2 | WEIGHT: 200 LBS | OXYGEN SATURATION: 96 % | HEART RATE: 69 BPM | HEIGHT: 72 IN | DIASTOLIC BLOOD PRESSURE: 78 MMHG | SYSTOLIC BLOOD PRESSURE: 138 MMHG

## 2022-03-29 DIAGNOSIS — I10 ESSENTIAL (PRIMARY) HYPERTENSION: ICD-10-CM

## 2022-03-29 DIAGNOSIS — E11.9 TYPE 2 DIABETES MELLITUS W/OUT COMPLICATIONS: ICD-10-CM

## 2022-03-29 DIAGNOSIS — I25.10 ATHEROSCLEROTIC HEART DISEASE OF NATIVE CORONARY ARTERY W/OUT ANGINA PECTORIS: ICD-10-CM

## 2022-03-29 DIAGNOSIS — I35.0 NONRHEUMATIC AORTIC (VALVE) STENOSIS: ICD-10-CM

## 2022-03-29 PROCEDURE — 93320 DOPPLER ECHO COMPLETE: CPT

## 2022-03-29 PROCEDURE — 36415 COLL VENOUS BLD VENIPUNCTURE: CPT

## 2022-03-29 PROCEDURE — 93351 STRESS TTE COMPLETE: CPT

## 2022-03-29 PROCEDURE — ZZZZZ: CPT

## 2022-03-29 PROCEDURE — 93325 DOPPLER ECHO COLOR FLOW MAPG: CPT

## 2022-03-29 PROCEDURE — 99214 OFFICE O/P EST MOD 30 MIN: CPT | Mod: 25

## 2022-03-29 NOTE — CARDIOLOGY SUMMARY
[de-identified] : 3/29/2022 exercised 6 minutes to a heart rate of only 109 and a blood pressure of 156/70.  Insisted on stopping for fatigue and headache but no chest pain or shortness of breath.  No ST changes.  Few APCs and occasional VPCs. No echo evidence of ischemia. [de-identified] : 3/29/2022 MAC with normal mitral valve and minimal MR.  Calcified trileaflet aortic valve with normal opening and minimal AI.  Aortic root 4.1 cm at sinus of Valsalva and ascending aorta 3.9 cm.  Normal left atrium.  Normal LV size and function with LVEF 62%.  Stage I diastolic dysfunction.  Normal RV size and function with mild TR.  RVSP 33.  Normal pericardium with no effusion.

## 2022-03-29 NOTE — REVIEW OF SYSTEMS
[Joint Pain] : joint pain [Tingling (Paresthesia)] : tingling [Negative] : Heme/Lymph [FreeTextEntry9] : see HPI [de-identified] : Radicular pain left side

## 2022-03-29 NOTE — HISTORY OF PRESENT ILLNESS
[FreeTextEntry1] : 1/28/2022.  Long complicated history.  He Is originally from [facility].  He has a niece who lives in [facility] and a daughter who is a family practice physician and was living in [facility].  In 2018 he went to visit her and while there had episodes of his heart "vibrating" with 2 brief syncopal or near syncopal episodes.  He did not think the physician in [place] was taking him seriously and he decided to drive to [facility] to see his niece.  It was a 2-week drive and he had frequent episodes and feelings of his heart "vibrating".  At one point he said he was then found to have blockages and had stents and then later he said he actually was going to have a colonoscopy and when he told the doctor there his story they sent him to the emergency room.  In either case he ended up with a stent and then 6 weeks later had a second stent but there are no records and he has no idea which arteries.  This was done at [facility].  Since then he no longer has any heart vibrating episodes and has been fairly healthy.  He is active, walks 4 miles a day and his main complaint is pain down his left side of his face left shoulder left arm etc.  Along the way he has seen a vascular the above documentation completed by the scribe is an accurate record of both my words and actions.  We found an abnormal ZOHREH on the left but not on the right and some mild venous insufficiency.  He was also sent to a spine surgeon disc disease was in fact found.  He has had no other hospitalizations, no surgeries.  He is diabetic for 11 years as was his paternal grandmother.  There is no family history of coronary disease.  He never smoked was never told of hypertension or hyperlipidemia.  He does not drink alcohol.  He has a twin sister in [facility] who has diabetes but no coronary disease.  He is on a high-dose statin, lisinopril, metoprolol and still remains on dual antiplatelet therapy which includes Plavix and 2 baby aspirin which I believe he does on his own.  He claims he gets some heartburn and trouble the pill going down when he takes the Plavix.\par Patient clearly needs better glucose control.  Lipid profile acceptable although ideally the LDL should be below 70.  Exam blood pressure ECG while acceptable.  I told him he could stop the Plavix as it is way past a year and there is no need for dual antiplatelet therapy.  I also told him one aspirin is probably enough but I believe he wants to continue to baby aspirin.  I am scheduling him for a stress echocardiogram and any further work-up and management will depend on those results. \par March 29, 2022.  Patient returns for follow-up along with stress echocardiogram.  The echo had minimal MR, calcified aortic valve but only minimal AI, aortic root of 4.1 cm with an ascending aorta of 3.9 cm.  Normal LV systolic function with stage I diastolic dysfunction.  Normal RV size and function with just mild TR and RVSP 33.  Patient could only do 6 minutes of exercise and his heart rate was only 109 and he insisted on stopping because of fatigue and a headache.  No chest pain, no shortness of breath, no significant ST changes, no arrhythmias, and no echo evidence of ischemia although again low heart rate and low workload.  Patient otherwise with no complaints.\par \par

## 2022-03-29 NOTE — PHYSICAL EXAM
[Well Developed] : well developed [Well Nourished] : well nourished [No Acute Distress] : no acute distress [Normal Conjunctiva] : normal conjunctiva [Normal Venous Pressure] : normal venous pressure [No Carotid Bruit] : no carotid bruit [Normal S1, S2] : normal S1, S2 [No Murmur] : no murmur [No Rub] : no rub [No Gallop] : no gallop [Clear Lung Fields] : clear lung fields [Good Air Entry] : good air entry [No Respiratory Distress] : no respiratory distress  [Soft] : abdomen soft [Non Tender] : non-tender [No Masses/organomegaly] : no masses/organomegaly [Normal Bowel Sounds] : normal bowel sounds [Normal Gait] : normal gait [No Edema] : no edema [No Cyanosis] : no cyanosis [No Clubbing] : no clubbing [No Varicosities] : no varicosities [Normal Radial B/L] : normal radial B/L [No Rash] : no rash [No Skin Lesions] : no skin lesions [Moves all extremities] : moves all extremities [No Focal Deficits] : no focal deficits [Normal Speech] : normal speech [Alert and Oriented] : alert and oriented [Normal memory] : normal memory [de-identified] : 1+ pedal pulses on the left and difficult to palpate pedal pulses on the right

## 2022-03-29 NOTE — REASON FOR VISIT
[FreeTextEntry1] : 71-year-old man originally from [facility] who had 2 stents placed about 3 years ago.

## 2022-03-30 ENCOUNTER — NON-APPOINTMENT (OUTPATIENT)
Age: 72
End: 2022-03-30

## 2022-03-30 LAB
ALBUMIN SERPL ELPH-MCNC: 4.5 G/DL
ALP BLD-CCNC: 55 U/L
ALT SERPL-CCNC: 27 U/L
ANION GAP SERPL CALC-SCNC: 13 MMOL/L
AST SERPL-CCNC: 16 U/L
BASOPHILS # BLD AUTO: 0.04 K/UL
BASOPHILS NFR BLD AUTO: 0.9 %
BILIRUB SERPL-MCNC: 0.5 MG/DL
BUN SERPL-MCNC: 31 MG/DL
CALCIUM SERPL-MCNC: 9.4 MG/DL
CHLORIDE SERPL-SCNC: 106 MMOL/L
CHOLEST SERPL-MCNC: 208 MG/DL
CO2 SERPL-SCNC: 21 MMOL/L
CREAT SERPL-MCNC: 1.03 MG/DL
EGFR: 78 ML/MIN/1.73M2
EOSINOPHIL # BLD AUTO: 0.12 K/UL
EOSINOPHIL NFR BLD AUTO: 2.6 %
ESTIMATED AVERAGE GLUCOSE: 212 MG/DL
GLUCOSE SERPL-MCNC: 307 MG/DL
HBA1C MFR BLD HPLC: 9 %
HCT VFR BLD CALC: 46.4 %
HDLC SERPL-MCNC: 61 MG/DL
HGB BLD-MCNC: 14.6 G/DL
IMM GRANULOCYTES NFR BLD AUTO: 0.2 %
LDLC SERPL CALC-MCNC: 113 MG/DL
LYMPHOCYTES # BLD AUTO: 1.19 K/UL
LYMPHOCYTES NFR BLD AUTO: 25.8 %
MAN DIFF?: NORMAL
MCHC RBC-ENTMCNC: 30 PG
MCHC RBC-ENTMCNC: 31.5 GM/DL
MCV RBC AUTO: 95.5 FL
MONOCYTES # BLD AUTO: 0.38 K/UL
MONOCYTES NFR BLD AUTO: 8.2 %
NEUTROPHILS # BLD AUTO: 2.87 K/UL
NEUTROPHILS NFR BLD AUTO: 62.3 %
NONHDLC SERPL-MCNC: 147 MG/DL
NT-PROBNP SERPL-MCNC: 48 PG/ML
PLATELET # BLD AUTO: 229 K/UL
POTASSIUM SERPL-SCNC: 5.2 MMOL/L
PROT SERPL-MCNC: 6.5 G/DL
RBC # BLD: 4.86 M/UL
RBC # FLD: 12.8 %
SODIUM SERPL-SCNC: 139 MMOL/L
TRIGL SERPL-MCNC: 167 MG/DL
WBC # FLD AUTO: 4.61 K/UL

## 2022-03-30 RX ORDER — EZETIMIBE 10 MG/1
10 TABLET ORAL DAILY
Qty: 90 | Refills: 0 | Status: ACTIVE | COMMUNITY
Start: 2022-03-30 | End: 1900-01-01

## 2022-04-08 NOTE — ED PROVIDER NOTE - NEUROLOGICAL, MLM
Routing to Dr. Martinez to advise.      Alert and oriented, no focal deficits, no motor or sensory deficits.

## 2022-06-28 ENCOUNTER — APPOINTMENT (OUTPATIENT)
Dept: CARDIOLOGY | Facility: CLINIC | Age: 72
End: 2022-06-28

## 2022-06-28 ENCOUNTER — NON-APPOINTMENT (OUTPATIENT)
Age: 72
End: 2022-06-28

## 2022-10-05 ENCOUNTER — NON-APPOINTMENT (OUTPATIENT)
Age: 72
End: 2022-10-05

## 2023-03-19 ENCOUNTER — RX RENEWAL (OUTPATIENT)
Age: 73
End: 2023-03-19

## 2023-03-19 RX ORDER — BLOOD SUGAR DIAGNOSTIC
STRIP MISCELLANEOUS
Qty: 1 | Refills: 0 | Status: ACTIVE | COMMUNITY
Start: 2022-01-21 | End: 1900-01-01

## 2024-04-27 NOTE — ED ADULT NURSE NOTE - NS ED NOTE  TALK SOMEONE YN
DISPLAY PLAN FREE TEXT DISPLAY PLAN FREE TEXT DISPLAY PLAN FREE TEXT DISPLAY PLAN FREE TEXT DISPLAY PLAN FREE TEXT DISPLAY PLAN FREE TEXT DISPLAY PLAN FREE TEXT DISPLAY PLAN FREE TEXT DISPLAY PLAN FREE TEXT DISPLAY PLAN FREE TEXT DISPLAY PLAN FREE TEXT DISPLAY PLAN FREE TEXT No

## 2024-11-15 NOTE — ED ADULT NURSE NOTE - NSSISCREENINGQ1_ED_A_ED
Detail Level: Zone
Render In Strict Bullet Format?: No
Initiate Treatment: Metronidazole 0.75% topical cream apply to affected areas on face in the morning. Apply moisturizer and sunscreen on top.
No

## 2025-02-03 NOTE — ED ADULT TRIAGE NOTE - NS ED NOTE AC HIGH RISK COUNTRIES
SURGEON: Dr. Fabio Garcia       Rosales catheter     INDICATIONS FOR OPERATION: Adebayo Longo Formerly [facility]       No

## 2025-06-30 NOTE — ED PROVIDER NOTE - CROS ED CONS ALL NEG
[facility]  Colonoscopy H&P      Identification: Juan Garcia is a 50 year old male with a MRN [id number]    Primary Care Physician: James Gonzalez MD    Date of Encounter: 06/30/25    Procedure(s): Colonoscopy    Indication(s): See HPI below.    History of Present Illness: Kieran is here today to undergo colonoscopy for evaluation and management of the problem(s) stated below.    Past medical, surgical, family and social history as well as current medications were reviewed in [facility], and updated accordingly.    Physical Examination: Vitals signs reviewed. Focused physical examination performed and patient was found eligible to undergo the procedure(s) stated below.    Laboratory tests and diagnostic imaging were personally reviewed in Epic.    Assessment:  1. Special screening for malignant neoplasms, colon      Plan:  Proceed with colonoscopy as previously scheduled.  The patient was informed about the benefits, risks, and alternatives of colonoscopy. Risks including bleeding, perforation, intra-abdominal injury, and missed lesions (as no screening method is entirely infallible) were discussed. The patient verbalized understanding and agreed to proceed.     Justino Baron MD  [facility]  
negative...